# Patient Record
Sex: FEMALE | Race: WHITE | Employment: FULL TIME | URBAN - NONMETROPOLITAN AREA
[De-identification: names, ages, dates, MRNs, and addresses within clinical notes are randomized per-mention and may not be internally consistent; named-entity substitution may affect disease eponyms.]

---

## 2017-12-08 ENCOUNTER — APPOINTMENT (OUTPATIENT)
Dept: LAB | Age: 47
End: 2017-12-08
Attending: FAMILY MEDICINE
Payer: COMMERCIAL

## 2017-12-08 ENCOUNTER — OFFICE VISIT (OUTPATIENT)
Dept: FAMILY MEDICINE CLINIC | Facility: CLINIC | Age: 47
End: 2017-12-08

## 2017-12-08 VITALS
HEIGHT: 67.25 IN | OXYGEN SATURATION: 99 % | DIASTOLIC BLOOD PRESSURE: 70 MMHG | BODY MASS INDEX: 32.11 KG/M2 | TEMPERATURE: 98 F | HEART RATE: 70 BPM | SYSTOLIC BLOOD PRESSURE: 138 MMHG | WEIGHT: 207 LBS

## 2017-12-08 DIAGNOSIS — E55.9 VITAMIN D DEFICIENCY: ICD-10-CM

## 2017-12-08 DIAGNOSIS — Z80.3 FHX: BREAST CANCER: ICD-10-CM

## 2017-12-08 DIAGNOSIS — Z00.00 PREVENTATIVE HEALTH CARE: ICD-10-CM

## 2017-12-08 DIAGNOSIS — Z12.11 SCREEN FOR COLON CANCER: ICD-10-CM

## 2017-12-08 DIAGNOSIS — E78.00 HYPERCHOLESTEREMIA: ICD-10-CM

## 2017-12-08 DIAGNOSIS — E83.42 HYPOMAGNESEMIA: ICD-10-CM

## 2017-12-08 DIAGNOSIS — Z00.00 PREVENTATIVE HEALTH CARE: Primary | ICD-10-CM

## 2017-12-08 DIAGNOSIS — Z23 NEED FOR VACCINATION: ICD-10-CM

## 2017-12-08 DIAGNOSIS — E66.9 OBESITY (BMI 30.0-34.9): ICD-10-CM

## 2017-12-08 PROCEDURE — 90715 TDAP VACCINE 7 YRS/> IM: CPT | Performed by: FAMILY MEDICINE

## 2017-12-08 PROCEDURE — 99396 PREV VISIT EST AGE 40-64: CPT | Performed by: FAMILY MEDICINE

## 2017-12-08 PROCEDURE — 36415 COLL VENOUS BLD VENIPUNCTURE: CPT | Performed by: FAMILY MEDICINE

## 2017-12-08 PROCEDURE — 90472 IMMUNIZATION ADMIN EACH ADD: CPT | Performed by: FAMILY MEDICINE

## 2017-12-08 PROCEDURE — 90471 IMMUNIZATION ADMIN: CPT | Performed by: FAMILY MEDICINE

## 2017-12-08 PROCEDURE — 90686 IIV4 VACC NO PRSV 0.5 ML IM: CPT | Performed by: FAMILY MEDICINE

## 2017-12-08 PROCEDURE — 80061 LIPID PANEL: CPT | Performed by: FAMILY MEDICINE

## 2017-12-08 PROCEDURE — 82306 VITAMIN D 25 HYDROXY: CPT | Performed by: FAMILY MEDICINE

## 2017-12-08 PROCEDURE — 83735 ASSAY OF MAGNESIUM: CPT | Performed by: FAMILY MEDICINE

## 2017-12-08 PROCEDURE — 80053 COMPREHEN METABOLIC PANEL: CPT | Performed by: FAMILY MEDICINE

## 2017-12-08 NOTE — PATIENT INSTRUCTIONS
I reviewed age-appropriate preventive health screening exams. Discussed age-appropriate immunizations.   Would recommend Tdap and flu vaccine-given  We will check fasting lipids, CMP, vitamin D and magnesium level today  Discussed colon cancer screening op

## 2017-12-08 NOTE — H&P
HPI:   Clare Rojas is a 50year old female who presents for a complete physical exam.  Patient concerns:   none.      Wt Readings from Last 6 Encounters:  12/08/17 : 207 lb  04/07/16 : 207 lb 8 oz  02/24/15 : 205 lb  02/10/15 : 204 lb  10/14/14 : 205 lb Disorder Mother 54     CABG, AVR   • Cancer Mother 28     BREAST CANCER   • Obesity Mother    • Other Damon Herb Mother    • CABG [OTHER] Mother    • Asthma Sister      Pernicious anemia   • Other Damon Herb Sister    • CELIAC [OTHER] Sister    • Pulmonary Disea Wt 207 lb   SpO2 99%   BMI 32.18 kg/m²   Body mass index is 32.18 kg/m².    GENERAL: well developed, well nourished,in no apparent distress  SKIN: no rashes,no suspicious lesions, +tattoo right scapula, 4 cm lipoma right thoracic region  HEENT: Ears:  TMs file.  Patient Instructions   I reviewed age-appropriate preventive health screening exams. Discussed age-appropriate immunizations.   Would recommend Tdap and flu vaccine-given  We will check fasting lipids, CMP, vitamin D and magnesium level today  Discu

## 2017-12-09 DIAGNOSIS — E78.00 HYPERCHOLESTEREMIA: Primary | ICD-10-CM

## 2017-12-21 ENCOUNTER — TELEPHONE (OUTPATIENT)
Dept: FAMILY MEDICINE CLINIC | Facility: CLINIC | Age: 47
End: 2017-12-21

## 2018-08-27 ENCOUNTER — OFFICE VISIT (OUTPATIENT)
Dept: FAMILY MEDICINE CLINIC | Facility: CLINIC | Age: 48
End: 2018-08-27
Payer: COMMERCIAL

## 2018-08-27 ENCOUNTER — TELEPHONE (OUTPATIENT)
Dept: FAMILY MEDICINE CLINIC | Facility: CLINIC | Age: 48
End: 2018-08-27

## 2018-08-27 VITALS
SYSTOLIC BLOOD PRESSURE: 120 MMHG | DIASTOLIC BLOOD PRESSURE: 70 MMHG | OXYGEN SATURATION: 99 % | BODY MASS INDEX: 32 KG/M2 | HEART RATE: 74 BPM | WEIGHT: 204 LBS | TEMPERATURE: 98 F

## 2018-08-27 DIAGNOSIS — H18.9 CORNEAL ABNORMALITY: Primary | ICD-10-CM

## 2018-08-27 DIAGNOSIS — H11.002 PTERYGIUM EYE, LEFT: ICD-10-CM

## 2018-08-27 PROCEDURE — 99213 OFFICE O/P EST LOW 20 MIN: CPT | Performed by: FAMILY MEDICINE

## 2018-08-27 NOTE — TELEPHONE ENCOUNTER
Pt calls. States she woke up this morning with a blood in her iris. No known injury, didn't wear contacts yesterday. +mild irritation. No drng or swelling. Pt has not contacted here eye doctor yet.  Asks if she can be seen here first?

## 2018-08-27 NOTE — PATIENT INSTRUCTIONS
Appointment facilitated with patient's optometrist at 12:30 today  Avoid rubbing eye, wear sunglasses for comfort

## 2018-08-27 NOTE — TELEPHONE ENCOUNTER
Pt left a message on VM stating she woke up with blood in her eye and wants to know if she can be seen today.   Please call

## 2018-08-27 NOTE — PROGRESS NOTES
HPI:    Patient ID: April D Brent De is a 46year old female.   Patient presents with:  Eye Problem: LEFT EYE-- BLOOD, PAINFUL    HPI pt states she woke with blood overlying her left cornea today, no photophobia, visual disturbance, tearing, etc.  Pt has n No orders of the defined types were placed in this encounter.       Meds This Visit:  No prescriptions requested or ordered in this encounter    Imaging & Referrals:  None       #[id number]

## 2018-09-11 NOTE — PROGRESS NOTES
Date: 2018    To:  Mukund Bernard  : 1970    I hope this letter finds you doing well. I am writing to inform you of the following:        The biopsies obtained from your recent colonoscopy indicate that the polyps were adenomas which, althou

## 2019-01-25 ENCOUNTER — MED REC SCAN ONLY (OUTPATIENT)
Dept: FAMILY MEDICINE CLINIC | Facility: CLINIC | Age: 49
End: 2019-01-25

## 2019-01-25 ENCOUNTER — TELEPHONE (OUTPATIENT)
Dept: FAMILY MEDICINE CLINIC | Facility: CLINIC | Age: 49
End: 2019-01-25

## 2019-02-22 ENCOUNTER — LABORATORY ENCOUNTER (OUTPATIENT)
Dept: LAB | Age: 49
End: 2019-02-22
Attending: FAMILY MEDICINE
Payer: COMMERCIAL

## 2019-02-22 ENCOUNTER — OFFICE VISIT (OUTPATIENT)
Dept: FAMILY MEDICINE CLINIC | Facility: CLINIC | Age: 49
End: 2019-02-22
Payer: COMMERCIAL

## 2019-02-22 VITALS
WEIGHT: 207.13 LBS | DIASTOLIC BLOOD PRESSURE: 76 MMHG | SYSTOLIC BLOOD PRESSURE: 118 MMHG | HEIGHT: 67.5 IN | RESPIRATION RATE: 12 BRPM | HEART RATE: 88 BPM | BODY MASS INDEX: 32.13 KG/M2 | TEMPERATURE: 99 F

## 2019-02-22 DIAGNOSIS — Z13.0 SCREENING, ANEMIA, DEFICIENCY, IRON: ICD-10-CM

## 2019-02-22 DIAGNOSIS — D12.6 TUBULAR ADENOMA OF COLON: ICD-10-CM

## 2019-02-22 DIAGNOSIS — Z23 NEED FOR VACCINATION: ICD-10-CM

## 2019-02-22 DIAGNOSIS — Z00.00 LABORATORY EXAM ORDERED AS PART OF ROUTINE GENERAL MEDICAL EXAMINATION: ICD-10-CM

## 2019-02-22 DIAGNOSIS — E55.9 VITAMIN D DEFICIENCY: ICD-10-CM

## 2019-02-22 DIAGNOSIS — Z12.4 SCREENING FOR CERVICAL CANCER: ICD-10-CM

## 2019-02-22 DIAGNOSIS — E78.00 HYPERCHOLESTEREMIA: ICD-10-CM

## 2019-02-22 DIAGNOSIS — E66.9 OBESITY (BMI 30.0-34.9): ICD-10-CM

## 2019-02-22 DIAGNOSIS — J30.89 SEASONAL ALLERGIC RHINITIS DUE TO OTHER ALLERGIC TRIGGER: ICD-10-CM

## 2019-02-22 DIAGNOSIS — H11.002 PTERYGIUM EYE, LEFT: ICD-10-CM

## 2019-02-22 DIAGNOSIS — H35.30 MACULAR DEGENERATION OF BOTH EYES, UNSPECIFIED TYPE: ICD-10-CM

## 2019-02-22 DIAGNOSIS — Z80.3 FHX: BREAST CANCER: ICD-10-CM

## 2019-02-22 DIAGNOSIS — E83.42 HYPOMAGNESEMIA: ICD-10-CM

## 2019-02-22 DIAGNOSIS — Z00.00 PREVENTATIVE HEALTH CARE: Primary | ICD-10-CM

## 2019-02-22 PROBLEM — H18.452 SALZMANN'S NODULAR DYSTROPHY OF LEFT EYE: Status: ACTIVE | Noted: 2018-02-21

## 2019-02-22 LAB
ALBUMIN SERPL-MCNC: 4.1 G/DL (ref 3.4–5)
ALBUMIN/GLOB SERPL: 1.1 {RATIO} (ref 1–2)
ALP LIVER SERPL-CCNC: 68 U/L (ref 41–108)
ALT SERPL-CCNC: 47 U/L (ref 13–56)
ANION GAP SERPL CALC-SCNC: 10 MMOL/L (ref 0–18)
AST SERPL-CCNC: 38 U/L (ref 15–37)
BASOPHILS # BLD AUTO: 0.01 X10(3) UL (ref 0–0.2)
BASOPHILS NFR BLD AUTO: 0.2 %
BILIRUB SERPL-MCNC: 0.4 MG/DL (ref 0.1–2)
BUN BLD-MCNC: 11 MG/DL (ref 7–18)
BUN/CREAT SERPL: 10.6 (ref 10–20)
CALCIUM BLD-MCNC: 9.5 MG/DL (ref 8.5–10.1)
CHLORIDE SERPL-SCNC: 101 MMOL/L (ref 98–107)
CHOLEST SMN-MCNC: 241 MG/DL (ref ?–200)
CO2 SERPL-SCNC: 27 MMOL/L (ref 21–32)
CREAT BLD-MCNC: 1.04 MG/DL (ref 0.55–1.02)
DEPRECATED RDW RBC AUTO: 41.2 FL (ref 35.1–46.3)
EOSINOPHIL # BLD AUTO: 0.02 X10(3) UL (ref 0–0.7)
EOSINOPHIL NFR BLD AUTO: 0.4 %
ERYTHROCYTE [DISTWIDTH] IN BLOOD BY AUTOMATED COUNT: 12.5 % (ref 11–15)
GLOBULIN PLAS-MCNC: 3.8 G/DL (ref 2.8–4.4)
GLUCOSE BLD-MCNC: 82 MG/DL (ref 70–99)
HAV IGM SER QL: 2.1 MG/DL (ref 1.6–2.6)
HCT VFR BLD AUTO: 41.6 % (ref 35–48)
HDLC SERPL-MCNC: 73 MG/DL (ref 40–59)
HGB BLD-MCNC: 13.8 G/DL (ref 12–16)
IMM GRANULOCYTES # BLD AUTO: 0.01 X10(3) UL (ref 0–1)
IMM GRANULOCYTES NFR BLD: 0.2 %
LDLC SERPL CALC-MCNC: 152 MG/DL (ref ?–100)
LYMPHOCYTES # BLD AUTO: 1.48 X10(3) UL (ref 1–4)
LYMPHOCYTES NFR BLD AUTO: 31.4 %
M PROTEIN MFR SERPL ELPH: 7.9 G/DL (ref 6.4–8.2)
MCH RBC QN AUTO: 30.1 PG (ref 26–34)
MCHC RBC AUTO-ENTMCNC: 33.2 G/DL (ref 31–37)
MCV RBC AUTO: 90.6 FL (ref 80–100)
MONOCYTES # BLD AUTO: 0.55 X10(3) UL (ref 0.1–1)
MONOCYTES NFR BLD AUTO: 11.7 %
NEUTROPHILS # BLD AUTO: 2.64 X10 (3) UL (ref 1.5–7.7)
NEUTROPHILS # BLD AUTO: 2.64 X10(3) UL (ref 1.5–7.7)
NEUTROPHILS NFR BLD AUTO: 56.1 %
NONHDLC SERPL-MCNC: 168 MG/DL (ref ?–130)
OSMOLALITY SERPL CALC.SUM OF ELEC: 284 MOSM/KG (ref 275–295)
PLATELET # BLD AUTO: 218 10(3)UL (ref 150–450)
POTASSIUM SERPL-SCNC: 4.2 MMOL/L (ref 3.5–5.1)
RBC # BLD AUTO: 4.59 X10(6)UL (ref 3.8–5.3)
SODIUM SERPL-SCNC: 138 MMOL/L (ref 136–145)
TRIGL SERPL-MCNC: 82 MG/DL (ref 30–149)
VIT D+METAB SERPL-MCNC: 66.5 NG/ML (ref 30–100)
VLDLC SERPL CALC-MCNC: 16 MG/DL (ref 0–30)
WBC # BLD AUTO: 4.7 X10(3) UL (ref 4–11)

## 2019-02-22 PROCEDURE — 85025 COMPLETE CBC W/AUTO DIFF WBC: CPT | Performed by: FAMILY MEDICINE

## 2019-02-22 PROCEDURE — 87624 HPV HI-RISK TYP POOLED RSLT: CPT | Performed by: FAMILY MEDICINE

## 2019-02-22 PROCEDURE — 36415 COLL VENOUS BLD VENIPUNCTURE: CPT | Performed by: FAMILY MEDICINE

## 2019-02-22 PROCEDURE — 90471 IMMUNIZATION ADMIN: CPT | Performed by: FAMILY MEDICINE

## 2019-02-22 PROCEDURE — 83735 ASSAY OF MAGNESIUM: CPT | Performed by: FAMILY MEDICINE

## 2019-02-22 PROCEDURE — 82306 VITAMIN D 25 HYDROXY: CPT | Performed by: FAMILY MEDICINE

## 2019-02-22 PROCEDURE — 99396 PREV VISIT EST AGE 40-64: CPT | Performed by: FAMILY MEDICINE

## 2019-02-22 PROCEDURE — 90686 IIV4 VACC NO PRSV 0.5 ML IM: CPT | Performed by: FAMILY MEDICINE

## 2019-02-22 PROCEDURE — 80053 COMPREHEN METABOLIC PANEL: CPT | Performed by: FAMILY MEDICINE

## 2019-02-22 PROCEDURE — 80061 LIPID PANEL: CPT | Performed by: FAMILY MEDICINE

## 2019-02-22 RX ORDER — AMINO ACIDS/MV,IRON,MIN
TABLET ORAL
COMMUNITY

## 2019-02-22 RX ORDER — CETIRIZINE HYDROCHLORIDE 10 MG/1
10 TABLET ORAL DAILY
COMMUNITY

## 2019-02-22 NOTE — PATIENT INSTRUCTIONS
I reviewed age-appropriate preventive health screening exams as well as immunizations.   Flu vaccine provided  ThinPrep Pap smear with HPV reflex was obtained  Reviewed importance of monthly self breast exams and annual mammograms  Discussed importance of l

## 2019-02-22 NOTE — H&P
HPI:   April D Flavia Fu is a 44year old female who presents for a complete physical exam.  Patient concerns:   none.      Wt Readings from Last 6 Encounters:  02/22/19 : 207 lb 2 oz  08/27/18 : 204 lb  08/22/18 : 200 lb  02/21/18 : 200 lb  12/08/17 : 207 BUTTOCK   • SUPERFICIAL KERATECTOMY Left 2/21/2018    Performed by Belen Kirk MD at [facility]      Family History   Problem Relation Age of Onset   • Heart Attack Father 36        MI   • Hypertension Father    • Cancer Father         NM discharge or itching,no uterine bleeding x several yrs, denies incontinence    MUSCULOSKELETAL: denies back or joint pain  NEURO: denies headaches, tremors, dizziness, numbness, tingling, muscular weakness  PSYCHE: denies depression or anxiety, denies any Affect:bright  ASSESSMENT AND PLAN:   April D Tiana Nair is a 44year old female   Preventative health care  (primary encounter diagnosis)  Hypercholesteremia  Fhx: breast cancer  Tubular adenoma of colon  Vitamin d deficiency  Macular degeneration of both

## 2019-02-23 DIAGNOSIS — E78.00 HYPERCHOLESTEREMIA: Primary | ICD-10-CM

## 2019-02-23 DIAGNOSIS — E55.9 VITAMIN D DEFICIENCY: ICD-10-CM

## 2019-02-26 LAB — HPV I/H RISK 1 DNA SPEC QL NAA+PROBE: NEGATIVE

## 2020-03-16 ENCOUNTER — TELEPHONE (OUTPATIENT)
Dept: FAMILY MEDICINE CLINIC | Facility: CLINIC | Age: 50
End: 2020-03-16

## 2020-03-19 ENCOUNTER — MED REC SCAN ONLY (OUTPATIENT)
Dept: FAMILY MEDICINE CLINIC | Facility: CLINIC | Age: 50
End: 2020-03-19

## 2020-03-21 ENCOUNTER — TELEPHONE (OUTPATIENT)
Dept: FAMILY MEDICINE CLINIC | Facility: CLINIC | Age: 50
End: 2020-03-21

## 2020-03-21 NOTE — TELEPHONE ENCOUNTER
Please advise patient the additional views and ultrasound of the right breast demonstrated a probably benign cyst.  A follow-up right breast ultrasound and mammogram are recommended in 6 months

## 2020-07-06 ENCOUNTER — TELEPHONE (OUTPATIENT)
Dept: FAMILY MEDICINE CLINIC | Facility: CLINIC | Age: 50
End: 2020-07-06

## 2020-07-06 NOTE — TELEPHONE ENCOUNTER
[place], PRETTY SURE SHE HAS AN EYE INFECTION, WANTS TO KNOW IF MEDS CAN BE CALLED IN TO A PHARMACY IN THAT AREA OR IF SHE NEEDS TO FIND A URGENT CARE OR SOMETHING TO BE SEEN AT?

## 2020-10-14 ENCOUNTER — TELEPHONE (OUTPATIENT)
Dept: FAMILY MEDICINE CLINIC | Facility: CLINIC | Age: 50
End: 2020-10-14

## 2020-10-14 NOTE — TELEPHONE ENCOUNTER
Please advise patient that is been over a year and a half since she has been seen.   I received her mammogram report and would like to have her follow-up in the office to discuss the mammogram result as well as undergo a breast exam.  Please schedule      D

## 2020-10-15 ENCOUNTER — OFFICE VISIT (OUTPATIENT)
Dept: FAMILY MEDICINE CLINIC | Facility: CLINIC | Age: 50
End: 2020-10-15
Payer: COMMERCIAL

## 2020-10-15 VITALS
BODY MASS INDEX: 32.44 KG/M2 | HEART RATE: 86 BPM | WEIGHT: 204.25 LBS | HEIGHT: 66.5 IN | SYSTOLIC BLOOD PRESSURE: 112 MMHG | DIASTOLIC BLOOD PRESSURE: 80 MMHG | TEMPERATURE: 97 F | OXYGEN SATURATION: 97 % | RESPIRATION RATE: 12 BRPM

## 2020-10-15 DIAGNOSIS — R92.8 ABNORMAL MAMMOGRAM OF RIGHT BREAST: Primary | ICD-10-CM

## 2020-10-15 DIAGNOSIS — N63.0 BREAST MASS: ICD-10-CM

## 2020-10-15 DIAGNOSIS — Z23 NEED FOR VACCINATION: ICD-10-CM

## 2020-10-15 DIAGNOSIS — Z80.3 FHX: BREAST CANCER: ICD-10-CM

## 2020-10-15 PROCEDURE — 90686 IIV4 VACC NO PRSV 0.5 ML IM: CPT | Performed by: FAMILY MEDICINE

## 2020-10-15 PROCEDURE — 3079F DIAST BP 80-89 MM HG: CPT | Performed by: FAMILY MEDICINE

## 2020-10-15 PROCEDURE — 90471 IMMUNIZATION ADMIN: CPT | Performed by: FAMILY MEDICINE

## 2020-10-15 PROCEDURE — 3074F SYST BP LT 130 MM HG: CPT | Performed by: FAMILY MEDICINE

## 2020-10-15 PROCEDURE — 3008F BODY MASS INDEX DOCD: CPT | Performed by: FAMILY MEDICINE

## 2020-10-15 PROCEDURE — 99213 OFFICE O/P EST LOW 20 MIN: CPT | Performed by: FAMILY MEDICINE

## 2020-10-15 NOTE — PATIENT INSTRUCTIONS
I reviewed mammogram reports and breast exam with patient. I agree with ultrasound localized needle biopsy. Patient will schedule through [facility] at her request to keep all of her care within the same hospital system.   Flu vaccine

## 2020-10-15 NOTE — PROGRESS NOTES
HPI:    Patient ID: April D Óscar Og is a 44year old female.     Patient presents with:  Abnormal Mammogram    Patient had a mammogram on 10/13/2020  IMPRESSION: SUSPICIOUS OF MALIGNANCY, TARGETED ULTRASOUND SUSPICIOUS OF MALIGNANCY   The 1 cm x 0.6 cm x • Multiple Vitamins-Minerals (OCUVITE EXTRA) Oral Tab Take by mouth. • cetirizine 10 MG Oral Tab Take 10 mg by mouth daily. • Levonorgestrel 13.5 MG Intrauterine IUD by Intrauterine route.      • Magnesium Oxide 400 (241.3 MG) MG Oral Tab Take 800 requested or ordered in this encounter       Imaging & Referrals:  FLULAVAL INFLUENZA VACCINE QUAD PRESERVATIVE FREE 0.5 ML  ELISE BIOPSY STEREO NODULE 1 SITE RIGHT (CPT=19081)       ML#[id number]

## 2020-11-06 ENCOUNTER — MED REC SCAN ONLY (OUTPATIENT)
Dept: FAMILY MEDICINE CLINIC | Facility: CLINIC | Age: 50
End: 2020-11-06

## 2021-04-13 ENCOUNTER — PATIENT MESSAGE (OUTPATIENT)
Dept: FAMILY MEDICINE CLINIC | Facility: CLINIC | Age: 51
End: 2021-04-13

## 2021-04-13 NOTE — TELEPHONE ENCOUNTER
From: Clare Ahumada  To: Matthieu Raymond DO  Sent: 4/13/2021 10:05 AM CDT  Subject: Non-Urgent Medical Question    I recently had my first vaccine with the [facility] and will have the second one May 6th.  I am also due for a follow up Mammogram on my

## 2021-05-07 ENCOUNTER — PATIENT MESSAGE (OUTPATIENT)
Dept: FAMILY MEDICINE CLINIC | Facility: CLINIC | Age: 51
End: 2021-05-07

## 2021-05-07 NOTE — TELEPHONE ENCOUNTER
From: Clare Ahumada  To: Jordan Doe DO  Sent: 5/7/2021 7:26 AM CDT  Subject: Other    Just an update so that you can update my records that i received my second Covid shot yesterday Coalfred Vuong lot# [id number].    Lali Shepherd has had both of his too but he

## 2021-08-25 ENCOUNTER — OFFICE VISIT (OUTPATIENT)
Dept: FAMILY MEDICINE CLINIC | Facility: CLINIC | Age: 51
End: 2021-08-25
Payer: COMMERCIAL

## 2021-08-25 ENCOUNTER — LAB ENCOUNTER (OUTPATIENT)
Dept: LAB | Age: 51
End: 2021-08-25
Attending: FAMILY MEDICINE
Payer: COMMERCIAL

## 2021-08-25 ENCOUNTER — TELEPHONE (OUTPATIENT)
Dept: FAMILY MEDICINE CLINIC | Facility: CLINIC | Age: 51
End: 2021-08-25

## 2021-08-25 VITALS
DIASTOLIC BLOOD PRESSURE: 80 MMHG | HEIGHT: 66.5 IN | WEIGHT: 207 LBS | BODY MASS INDEX: 32.87 KG/M2 | RESPIRATION RATE: 16 BRPM | TEMPERATURE: 98 F | SYSTOLIC BLOOD PRESSURE: 124 MMHG | OXYGEN SATURATION: 98 % | HEART RATE: 80 BPM

## 2021-08-25 DIAGNOSIS — Z00.00 LABORATORY EXAM ORDERED AS PART OF ROUTINE GENERAL MEDICAL EXAMINATION: ICD-10-CM

## 2021-08-25 DIAGNOSIS — E78.00 HYPERCHOLESTEREMIA: ICD-10-CM

## 2021-08-25 DIAGNOSIS — Z12.31 ENCOUNTER FOR SCREENING MAMMOGRAM FOR HIGH-RISK PATIENT: ICD-10-CM

## 2021-08-25 DIAGNOSIS — E55.9 VITAMIN D DEFICIENCY: ICD-10-CM

## 2021-08-25 DIAGNOSIS — R00.2 PALPITATIONS: ICD-10-CM

## 2021-08-25 DIAGNOSIS — D12.6 TUBULAR ADENOMA OF COLON: ICD-10-CM

## 2021-08-25 DIAGNOSIS — Z00.00 PREVENTATIVE HEALTH CARE: Primary | ICD-10-CM

## 2021-08-25 DIAGNOSIS — Z23 NEED FOR SHINGLES VACCINE: Primary | ICD-10-CM

## 2021-08-25 DIAGNOSIS — H35.30 MACULAR DEGENERATION OF BOTH EYES, UNSPECIFIED TYPE: ICD-10-CM

## 2021-08-25 DIAGNOSIS — Z80.3 FHX: BREAST CANCER: ICD-10-CM

## 2021-08-25 DIAGNOSIS — Z23 NEED FOR VACCINATION: ICD-10-CM

## 2021-08-25 DIAGNOSIS — R13.10 SWALLOWING DYSFUNCTION: ICD-10-CM

## 2021-08-25 DIAGNOSIS — Z13.29 SCREENING FOR THYROID DISORDER: ICD-10-CM

## 2021-08-25 DIAGNOSIS — F51.04 PSYCHOPHYSIOLOGICAL INSOMNIA: ICD-10-CM

## 2021-08-25 LAB
ALBUMIN SERPL-MCNC: 4.1 G/DL (ref 3.4–5)
ALBUMIN/GLOB SERPL: 1.1 {RATIO} (ref 1–2)
ALP LIVER SERPL-CCNC: 68 U/L
ALT SERPL-CCNC: 22 U/L
ANION GAP SERPL CALC-SCNC: 7 MMOL/L (ref 0–18)
AST SERPL-CCNC: 17 U/L (ref 15–37)
BILIRUB SERPL-MCNC: 0.6 MG/DL (ref 0.1–2)
BUN BLD-MCNC: 17 MG/DL (ref 7–18)
CALCIUM BLD-MCNC: 9.4 MG/DL (ref 8.5–10.1)
CHLORIDE SERPL-SCNC: 104 MMOL/L (ref 98–112)
CHOLEST SMN-MCNC: 291 MG/DL (ref ?–200)
CO2 SERPL-SCNC: 27 MMOL/L (ref 21–32)
CREAT BLD-MCNC: 1.09 MG/DL
GLOBULIN PLAS-MCNC: 3.8 G/DL (ref 2.8–4.4)
GLUCOSE BLD-MCNC: 83 MG/DL (ref 70–99)
HDLC SERPL-MCNC: 80 MG/DL (ref 40–59)
LDLC SERPL CALC-MCNC: 192 MG/DL (ref ?–100)
M PROTEIN MFR SERPL ELPH: 7.9 G/DL (ref 6.4–8.2)
NONHDLC SERPL-MCNC: 211 MG/DL (ref ?–130)
OSMOLALITY SERPL CALC.SUM OF ELEC: 287 MOSM/KG (ref 275–295)
POTASSIUM SERPL-SCNC: 4 MMOL/L (ref 3.5–5.1)
SODIUM SERPL-SCNC: 138 MMOL/L (ref 136–145)
TRIGL SERPL-MCNC: 110 MG/DL (ref 30–149)
TSI SER-ACNC: 2.22 MIU/ML (ref 0.36–3.74)
VIT D+METAB SERPL-MCNC: 87.3 NG/ML (ref 30–100)
VLDLC SERPL CALC-MCNC: 23 MG/DL (ref 0–30)

## 2021-08-25 PROCEDURE — 90471 IMMUNIZATION ADMIN: CPT | Performed by: FAMILY MEDICINE

## 2021-08-25 PROCEDURE — 3074F SYST BP LT 130 MM HG: CPT | Performed by: FAMILY MEDICINE

## 2021-08-25 PROCEDURE — 3079F DIAST BP 80-89 MM HG: CPT | Performed by: FAMILY MEDICINE

## 2021-08-25 PROCEDURE — 90750 HZV VACC RECOMBINANT IM: CPT | Performed by: FAMILY MEDICINE

## 2021-08-25 PROCEDURE — 99396 PREV VISIT EST AGE 40-64: CPT | Performed by: FAMILY MEDICINE

## 2021-08-25 PROCEDURE — 80053 COMPREHEN METABOLIC PANEL: CPT | Performed by: FAMILY MEDICINE

## 2021-08-25 PROCEDURE — 82306 VITAMIN D 25 HYDROXY: CPT | Performed by: FAMILY MEDICINE

## 2021-08-25 PROCEDURE — 84443 ASSAY THYROID STIM HORMONE: CPT | Performed by: FAMILY MEDICINE

## 2021-08-25 PROCEDURE — 3008F BODY MASS INDEX DOCD: CPT | Performed by: FAMILY MEDICINE

## 2021-08-25 PROCEDURE — 80061 LIPID PANEL: CPT | Performed by: FAMILY MEDICINE

## 2021-08-25 NOTE — PATIENT INSTRUCTIONS
1. Preventative health care  I reviewed age-appropriate preventative health screening exams. Repeat Pap 2024    2. FHx: breast cancer  Discussed importance of continued annual surveillance    3.  Encounter for screening mammogram for high-risk patient  Con

## 2021-08-25 NOTE — H&P
HPI:   Clare ESPINOSA Edgar Rosario is a 46year old female who presents for a complete physical exam.  Patient concerns:   See ROS.      Wt Readings from Last 6 Encounters:  08/25/21 : 207 lb (93.9 kg)  10/15/20 : 204 lb 4 oz (92.6 kg)  02/22/19 : 207 lb 2 oz (94 kg) HISTORY      LEFT SALPINGOOPHERECTOMY DUE TO ECTOPIC PREG   • OTHER SURGICAL HISTORY Left     superficial keratectomy   • SKIN SURGERY      EXCISION OF HISTIOCYTOMA RIGHT BUTTOCK   • STEREOTACTIC BREAST BIOPSY  10/13/2020    right breast mass, pathology: f problems swallowing, feels like her throat spasms,waits and then food goes down, no pain, no choking, occurred last night w/ rice, several times per week  LUNGS: denies shortness of breath with exertion, no coughing or wheezing  CARDIOVASCULAR: denies ches cervix without lesions, anteverted/anteflexed uterus palpable without masses, no adnexal masses or tenderness noted  MUSCULOSKELETAL: back is non-tender,FROM of the back  EXTREMITIES: FROM of all joints tested,  no cyanosis, clubbing or edema, no varicosit adenoma of colon-9/5/18  Repeat colonoscopy 2023    5. Macular degeneration of both eyes, unspecified type  Continue ophthalmology follow-up    6. Hypercholesteremia- high hdl  Reviewed goals for lipids  - VENIPUNCTURE  - LIPID PANEL; Future    7.  Psychoph

## 2021-10-07 ENCOUNTER — HOSPITAL ENCOUNTER (OUTPATIENT)
Dept: MAMMOGRAPHY | Age: 51
Discharge: HOME | End: 2021-10-07
Attending: FAMILY MEDICINE
Payer: COMMERCIAL

## 2021-10-07 DIAGNOSIS — Z12.31 ENCOUNTER FOR SCREENING MAMMOGRAM FOR HIGH-RISK PATIENT: ICD-10-CM

## 2021-10-07 PROCEDURE — 77067 SCR MAMMO BI INCL CAD: CPT | Performed by: FAMILY MEDICINE

## 2021-10-07 PROCEDURE — 77063 BREAST TOMOSYNTHESIS BI: CPT | Performed by: FAMILY MEDICINE

## 2021-10-15 ENCOUNTER — TELEPHONE (OUTPATIENT)
Dept: MAMMOGRAPHY | Facility: HOSPITAL | Age: 51
End: 2021-10-15

## 2021-10-15 ENCOUNTER — HOSPITAL ENCOUNTER (OUTPATIENT)
Dept: MAMMOGRAPHY | Facility: HOSPITAL | Age: 51
Discharge: HOME | End: 2021-10-15
Attending: FAMILY MEDICINE
Payer: COMMERCIAL

## 2021-10-15 DIAGNOSIS — R92.2 INCONCLUSIVE MAMMOGRAM: ICD-10-CM

## 2021-10-15 PROCEDURE — 77061 BREAST TOMOSYNTHESIS UNI: CPT | Performed by: FAMILY MEDICINE

## 2021-10-15 PROCEDURE — 76642 ULTRASOUND BREAST LIMITED: CPT | Performed by: FAMILY MEDICINE

## 2021-10-15 PROCEDURE — 77065 DX MAMMO INCL CAD UNI: CPT | Performed by: FAMILY MEDICINE

## 2021-10-15 NOTE — TELEPHONE ENCOUNTER
Attempted to call patient re: new breast imaging recommendation. Message left for patient to call back.

## 2021-10-18 NOTE — IMAGING NOTE
8064: Roel Cage is recommended for an ultrasound guided biopsy of the right breast by . Spoke with Clare Ahumada at this time. Introduced myself as breast care coordinator.      History   Inconclusive mammogram     FINDINGS:     Ce Iniguez from the nipple) demonstrates benign breast tissue with fibrosis. The pathology results do not mention   calcifications. Pathology is considered discordant and a repeat ultrasound-guided core needle biopsy is recommended.      BI-RADS CATEGORY:    DIAGNOS

## 2021-10-19 ENCOUNTER — OFFICE VISIT (OUTPATIENT)
Dept: SURGERY | Facility: CLINIC | Age: 51
End: 2021-10-19
Payer: COMMERCIAL

## 2021-10-19 VITALS — WEIGHT: 207 LBS | HEIGHT: 66.5 IN | BODY MASS INDEX: 32.87 KG/M2

## 2021-10-19 DIAGNOSIS — N63.10 BREAST MASS, RIGHT: ICD-10-CM

## 2021-10-19 DIAGNOSIS — Z91.89 AT HIGH RISK FOR BREAST CANCER: Primary | ICD-10-CM

## 2021-10-19 PROCEDURE — 99244 OFF/OP CNSLTJ NEW/EST MOD 40: CPT | Performed by: SURGERY

## 2021-10-19 PROCEDURE — 3008F BODY MASS INDEX DOCD: CPT | Performed by: SURGERY

## 2021-10-19 NOTE — H&P
April D Donald Sanchez is a 46year old female  Patient presents with:  Breast Problem: new patient referred by Dr. Catherine Levy for breast consult.  mammogram and US breast done at [facility]. pt had right breast bx last year, radiology is recommending another bx right arjun HISTIOCYTOMA RIGHT BUTTOCK   • STEREOTACTIC BREAST BIOPSY  10/13/2020    right breast mass, pathology: fibrosis     Multiple Vitamins-Minerals (OCUVITE EXTRA) Oral Tab, Take by mouth., Disp: , Rfl:   cetirizine 10 MG Oral Tab, Take 10 mg by mouth daily. , D diarrhea; no rectal bleeding; no heartburn  GENITAL/: no dysuria, urgency or frequency, no tea colored urine  MUSCULOSKELETAL: no joint complaints upper or lower extremities  HEMATOLOGY: denies hx anemia; denies bruising or excessive bleeding  Endocrine: 68  41 - 108 U/L Final   • Bilirubin, Total 08/25/2021 0.6  0.1 - 2.0 mg/dL Final   • Total Protein 08/25/2021 7.9  6.4 - 8.2 g/dL Final   • Albumin 08/25/2021 4.1  3.4 - 5.0 g/dL Final   • Globulin  08/25/2021 3.8  2.8 - 4.4 g/dL Final   • A/G Ratio 08/25 high >=190 mg/dL       • FASTING 08/25/2021 Patient not present   Final   • Vitamin D, 25OH, Total 08/25/2021 87.3  30.0 - 100.0 ng/mL Final    Literature Recommendations for 25(OH)D levels are:  Range           Vitamin D Status   <20    ng/mL      Deficie

## 2021-10-21 ENCOUNTER — TELEPHONE (OUTPATIENT)
Dept: SURGERY | Facility: CLINIC | Age: 51
End: 2021-10-21

## 2021-10-21 NOTE — TELEPHONE ENCOUNTER
MRI BREAST [facility] [address] [facility] # [id number]  VALID 10/21/21 - 11/19/21  PT NOTIFIED VIA VM.

## 2021-10-22 ENCOUNTER — TELEPHONE (OUTPATIENT)
Dept: SURGERY | Facility: CLINIC | Age: 51
End: 2021-10-22

## 2021-11-04 ENCOUNTER — TELEPHONE (OUTPATIENT)
Dept: SURGERY | Facility: CLINIC | Age: 51
End: 2021-11-04

## 2021-11-04 NOTE — TELEPHONE ENCOUNTER
Pt. Contacted office stating her authorization for her MRI is only good from 10/21-11/19 and she isn't able to get in to get it until 11/22. Pt. Is requesting we extend the authorization. I v/u.  I checked with the nurses to see if they would be able to do

## 2021-11-08 ENCOUNTER — HOSPITAL ENCOUNTER (OUTPATIENT)
Dept: MAMMOGRAPHY | Facility: HOSPITAL | Age: 51
Discharge: HOME | End: 2021-11-08
Attending: SURGERY
Payer: COMMERCIAL

## 2021-11-08 DIAGNOSIS — N63.10 BREAST MASS, RIGHT: ICD-10-CM

## 2021-11-08 PROCEDURE — 88305 TISSUE EXAM BY PATHOLOGIST: CPT | Performed by: SURGERY

## 2021-11-08 PROCEDURE — 19083 BX BREAST 1ST LESION US IMAG: CPT | Performed by: SURGERY

## 2021-11-08 PROCEDURE — 77065 DX MAMMO INCL CAD UNI: CPT | Performed by: SURGERY

## 2022-01-19 ENCOUNTER — TELEPHONE (OUTPATIENT)
Dept: FAMILY MEDICINE CLINIC | Facility: CLINIC | Age: 52
End: 2022-01-19

## 2022-01-19 DIAGNOSIS — Z23 NEED FOR SHINGLES VACCINE: Primary | ICD-10-CM

## 2022-01-19 NOTE — TELEPHONE ENCOUNTER
Pt is scheduled for 2nd Shingrex on 1/21/22  (first one given on 8/25/21)    Order placed---please sign

## 2022-01-21 ENCOUNTER — NURSE ONLY (OUTPATIENT)
Dept: FAMILY MEDICINE CLINIC | Facility: CLINIC | Age: 52
End: 2022-01-21
Payer: COMMERCIAL

## 2022-01-21 PROCEDURE — 90750 HZV VACC RECOMBINANT IM: CPT | Performed by: FAMILY MEDICINE

## 2022-01-21 PROCEDURE — 90471 IMMUNIZATION ADMIN: CPT | Performed by: FAMILY MEDICINE

## 2022-04-27 ENCOUNTER — PATIENT MESSAGE (OUTPATIENT)
Dept: FAMILY MEDICINE CLINIC | Facility: CLINIC | Age: 52
End: 2022-04-27

## 2022-04-27 NOTE — TELEPHONE ENCOUNTER
See below. I don't see in her path results or Dr. Astrid Adams note to repeat mammogram in 6 months. Okay to place order?   Or do you want Dr. Tiki Cline to?

## 2022-04-27 NOTE — TELEPHONE ENCOUNTER
From: Clare Ahumada  To: Jinny Hernandez DO  Sent: 4/27/2022 6:24 AM CDT  Subject: mamm followup     It has been 6 months since my biopsy and they are recommending a follow up mammogram. Could you please submit an order for that for me? Or do i have to ask Elissa Rausch for it? I will go to [facility] in [place] for that since that is where all my records are now. if you have questions my number is [phone].  thanks- April

## 2022-05-17 ENCOUNTER — TELEPHONE (OUTPATIENT)
Dept: FAMILY MEDICINE CLINIC | Facility: CLINIC | Age: 52
End: 2022-05-17

## 2022-05-17 ENCOUNTER — HOSPITAL ENCOUNTER (OUTPATIENT)
Dept: MAMMOGRAPHY | Facility: HOSPITAL | Age: 52
Discharge: HOME | End: 2022-05-17
Attending: FAMILY MEDICINE
Payer: COMMERCIAL

## 2022-05-17 DIAGNOSIS — R92.8 FOLLOW-UP EXAMINATION OF ABNORMAL MAMMOGRAM: ICD-10-CM

## 2022-05-17 DIAGNOSIS — R92.8 FOLLOW-UP EXAMINATION OF ABNORMAL MAMMOGRAM: Primary | ICD-10-CM

## 2022-05-17 PROCEDURE — 77065 DX MAMMO INCL CAD UNI: CPT | Performed by: FAMILY MEDICINE

## 2022-05-17 PROCEDURE — 77061 BREAST TOMOSYNTHESIS UNI: CPT | Performed by: FAMILY MEDICINE

## 2022-05-17 PROCEDURE — 76642 ULTRASOUND BREAST LIMITED: CPT | Performed by: FAMILY MEDICINE

## 2022-05-17 NOTE — TELEPHONE ENCOUNTER
Pt's R breat f/u mammogram was ordered as routine----needs to be changed to diagnostic. New order placed.

## 2022-05-18 DIAGNOSIS — Z12.31 ENCOUNTER FOR SCREENING MAMMOGRAM FOR MALIGNANT NEOPLASM OF BREAST: Primary | ICD-10-CM

## 2022-08-30 ENCOUNTER — OFFICE VISIT (OUTPATIENT)
Dept: FAMILY MEDICINE CLINIC | Facility: CLINIC | Age: 52
End: 2022-08-30
Payer: COMMERCIAL

## 2022-08-30 VITALS
TEMPERATURE: 98 F | WEIGHT: 205 LBS | BODY MASS INDEX: 32.56 KG/M2 | HEART RATE: 77 BPM | HEIGHT: 66.5 IN | SYSTOLIC BLOOD PRESSURE: 110 MMHG | OXYGEN SATURATION: 98 % | RESPIRATION RATE: 18 BRPM | DIASTOLIC BLOOD PRESSURE: 70 MMHG

## 2022-08-30 DIAGNOSIS — L98.9 SKIN LESION OF CHEEK: Primary | ICD-10-CM

## 2022-08-30 PROCEDURE — 99213 OFFICE O/P EST LOW 20 MIN: CPT | Performed by: FAMILY MEDICINE

## 2022-08-30 PROCEDURE — 3008F BODY MASS INDEX DOCD: CPT | Performed by: FAMILY MEDICINE

## 2022-08-30 PROCEDURE — 3078F DIAST BP <80 MM HG: CPT | Performed by: FAMILY MEDICINE

## 2022-08-30 PROCEDURE — 3074F SYST BP LT 130 MM HG: CPT | Performed by: FAMILY MEDICINE

## 2022-08-30 NOTE — PATIENT INSTRUCTIONS
I reviewed signs and symptoms of skin cancer as well as importance of frequent application of sunscreen. Would recommend topical cryotherapy with liquid nitrogen. Lesion treated with freeze thaw freeze cycle, patient tolerated well, have asked patient to either send a picture or report response to treatment and 2 to 3 weeks.

## 2022-12-15 ENCOUNTER — HOSPITAL ENCOUNTER (OUTPATIENT)
Dept: MAMMOGRAPHY | Facility: HOSPITAL | Age: 52
Discharge: HOME | End: 2022-12-15
Attending: FAMILY MEDICINE
Payer: COMMERCIAL

## 2022-12-15 DIAGNOSIS — Z12.31 ENCOUNTER FOR SCREENING MAMMOGRAM FOR MALIGNANT NEOPLASM OF BREAST: ICD-10-CM

## 2022-12-15 PROCEDURE — 77063 BREAST TOMOSYNTHESIS BI: CPT | Performed by: FAMILY MEDICINE

## 2022-12-15 PROCEDURE — 77067 SCR MAMMO BI INCL CAD: CPT | Performed by: FAMILY MEDICINE

## 2022-12-21 NOTE — TELEPHONE ENCOUNTER
rec'd mammogram results from [facility] stating \"a focal asymmetry in the R breast at 12 o'clock middle depth. Add'l views with possible u/s are recommended. Order for R breast diagnostic mammogram and u/s signed and faxed back to [facility] radiology.
Never smoker

## 2023-04-27 ENCOUNTER — LABORATORY ENCOUNTER (OUTPATIENT)
Dept: LAB | Age: 53
End: 2023-04-27
Attending: FAMILY MEDICINE
Payer: COMMERCIAL

## 2023-04-27 ENCOUNTER — OFFICE VISIT (OUTPATIENT)
Dept: FAMILY MEDICINE CLINIC | Facility: CLINIC | Age: 53
End: 2023-04-27
Payer: COMMERCIAL

## 2023-04-27 VITALS
TEMPERATURE: 98 F | RESPIRATION RATE: 18 BRPM | HEART RATE: 84 BPM | SYSTOLIC BLOOD PRESSURE: 114 MMHG | OXYGEN SATURATION: 100 % | BODY MASS INDEX: 32.56 KG/M2 | DIASTOLIC BLOOD PRESSURE: 80 MMHG | HEIGHT: 66.73 IN | WEIGHT: 205 LBS

## 2023-04-27 DIAGNOSIS — H18.452 SALZMANN'S NODULAR DYSTROPHY OF LEFT EYE: ICD-10-CM

## 2023-04-27 DIAGNOSIS — Z13.0 SCREENING, ANEMIA, DEFICIENCY, IRON: ICD-10-CM

## 2023-04-27 DIAGNOSIS — Z13.21 ENCOUNTER FOR VITAMIN DEFICIENCY SCREENING: ICD-10-CM

## 2023-04-27 DIAGNOSIS — H35.30 MACULAR DEGENERATION OF BOTH EYES, UNSPECIFIED TYPE: ICD-10-CM

## 2023-04-27 DIAGNOSIS — E78.00 HYPERCHOLESTEREMIA: ICD-10-CM

## 2023-04-27 DIAGNOSIS — D12.6 TUBULAR ADENOMA OF COLON: ICD-10-CM

## 2023-04-27 DIAGNOSIS — H93.13 TINNITUS OF BOTH EARS: ICD-10-CM

## 2023-04-27 DIAGNOSIS — Z80.3 FHX: BREAST CANCER: ICD-10-CM

## 2023-04-27 DIAGNOSIS — N95.1 MENOPAUSAL STATE: ICD-10-CM

## 2023-04-27 DIAGNOSIS — Z12.4 SCREENING FOR CERVICAL CANCER: ICD-10-CM

## 2023-04-27 DIAGNOSIS — Z00.00 PREVENTATIVE HEALTH CARE: Primary | ICD-10-CM

## 2023-04-27 DIAGNOSIS — Z00.00 PREVENTATIVE HEALTH CARE: ICD-10-CM

## 2023-04-27 DIAGNOSIS — Z11.51 SCREENING FOR HUMAN PAPILLOMAVIRUS (HPV): ICD-10-CM

## 2023-04-27 DIAGNOSIS — Z12.11 SCREEN FOR COLON CANCER: ICD-10-CM

## 2023-04-27 DIAGNOSIS — Z86.79 H/O: RHEUMATIC FEVER: ICD-10-CM

## 2023-04-27 LAB
ALBUMIN SERPL-MCNC: 4.1 G/DL (ref 3.4–5)
ALBUMIN/GLOB SERPL: 1.3 {RATIO} (ref 1–2)
ALP LIVER SERPL-CCNC: 53 U/L
ALT SERPL-CCNC: 25 U/L
ANION GAP SERPL CALC-SCNC: 1 MMOL/L (ref 0–18)
AST SERPL-CCNC: 15 U/L (ref 15–37)
BASOPHILS # BLD AUTO: 0.02 X10(3) UL (ref 0–0.2)
BASOPHILS NFR BLD AUTO: 0.3 %
BILIRUB SERPL-MCNC: 0.5 MG/DL (ref 0.1–2)
BUN BLD-MCNC: 11 MG/DL (ref 7–18)
CALCIUM BLD-MCNC: 9.2 MG/DL (ref 8.5–10.1)
CHLORIDE SERPL-SCNC: 105 MMOL/L (ref 98–112)
CHOLEST SERPL-MCNC: 263 MG/DL (ref ?–200)
CO2 SERPL-SCNC: 30 MMOL/L (ref 21–32)
CREAT BLD-MCNC: 1.03 MG/DL
EOSINOPHIL # BLD AUTO: 0.05 X10(3) UL (ref 0–0.7)
EOSINOPHIL NFR BLD AUTO: 0.8 %
ERYTHROCYTE [DISTWIDTH] IN BLOOD BY AUTOMATED COUNT: 12.6 %
FASTING PATIENT LIPID ANSWER: YES
FASTING STATUS PATIENT QL REPORTED: YES
GFR SERPLBLD BASED ON 1.73 SQ M-ARVRAT: 64 ML/MIN/1.73M2 (ref 60–?)
GLOBULIN PLAS-MCNC: 3.2 G/DL (ref 2.8–4.4)
GLUCOSE BLD-MCNC: 88 MG/DL (ref 70–99)
HCT VFR BLD AUTO: 39.4 %
HDLC SERPL-MCNC: 94 MG/DL (ref 40–59)
HGB BLD-MCNC: 12.7 G/DL
IMM GRANULOCYTES # BLD AUTO: 0.01 X10(3) UL (ref 0–1)
IMM GRANULOCYTES NFR BLD: 0.2 %
LDLC SERPL CALC-MCNC: 161 MG/DL (ref ?–100)
LYMPHOCYTES # BLD AUTO: 2.03 X10(3) UL (ref 1–4)
LYMPHOCYTES NFR BLD AUTO: 30.9 %
MCH RBC QN AUTO: 29.2 PG (ref 26–34)
MCHC RBC AUTO-ENTMCNC: 32.2 G/DL (ref 31–37)
MCV RBC AUTO: 90.6 FL
MONOCYTES # BLD AUTO: 0.39 X10(3) UL (ref 0.1–1)
MONOCYTES NFR BLD AUTO: 5.9 %
NEUTROPHILS # BLD AUTO: 4.06 X10 (3) UL (ref 1.5–7.7)
NEUTROPHILS # BLD AUTO: 4.06 X10(3) UL (ref 1.5–7.7)
NEUTROPHILS NFR BLD AUTO: 61.9 %
NONHDLC SERPL-MCNC: 169 MG/DL (ref ?–130)
OSMOLALITY SERPL CALC.SUM OF ELEC: 281 MOSM/KG (ref 275–295)
PLATELET # BLD AUTO: 313 10(3)UL (ref 150–450)
POTASSIUM SERPL-SCNC: 4.1 MMOL/L (ref 3.5–5.1)
PROT SERPL-MCNC: 7.3 G/DL (ref 6.4–8.2)
RBC # BLD AUTO: 4.35 X10(6)UL
SODIUM SERPL-SCNC: 136 MMOL/L (ref 136–145)
TRIGL SERPL-MCNC: 51 MG/DL (ref 30–149)
VIT D+METAB SERPL-MCNC: 77.1 NG/ML (ref 30–100)
VLDLC SERPL CALC-MCNC: 10 MG/DL (ref 0–30)
WBC # BLD AUTO: 6.6 X10(3) UL (ref 4–11)

## 2023-04-27 PROCEDURE — 3074F SYST BP LT 130 MM HG: CPT | Performed by: FAMILY MEDICINE

## 2023-04-27 PROCEDURE — 3008F BODY MASS INDEX DOCD: CPT | Performed by: FAMILY MEDICINE

## 2023-04-27 PROCEDURE — 80053 COMPREHEN METABOLIC PANEL: CPT | Performed by: FAMILY MEDICINE

## 2023-04-27 PROCEDURE — 85025 COMPLETE CBC W/AUTO DIFF WBC: CPT | Performed by: FAMILY MEDICINE

## 2023-04-27 PROCEDURE — 80061 LIPID PANEL: CPT | Performed by: FAMILY MEDICINE

## 2023-04-27 PROCEDURE — 99396 PREV VISIT EST AGE 40-64: CPT | Performed by: FAMILY MEDICINE

## 2023-04-27 PROCEDURE — 3079F DIAST BP 80-89 MM HG: CPT | Performed by: FAMILY MEDICINE

## 2023-04-27 PROCEDURE — 87624 HPV HI-RISK TYP POOLED RSLT: CPT | Performed by: FAMILY MEDICINE

## 2023-04-27 PROCEDURE — 82306 VITAMIN D 25 HYDROXY: CPT | Performed by: FAMILY MEDICINE

## 2023-04-27 NOTE — PATIENT INSTRUCTIONS
1. Preventative health care  I reviewed age-appropriate preventive health screen exams as well as immunizations  - LIPID PANEL; Future  - COMP METABOLIC PANEL (14); Future  - VITAMIN D; Future    2. Tubular adenoma of colon-9/5/18  Patient referred to Dr. Adam Re[facility] for follow-up colonoscopy this fall  - GASTRO - INTERNAL    3. Hypercholesteremia- high hdl  Reviewed goals for lipids  - LIPID PANEL; Future    4. FHx: breast cancer  Continue annual surveillance    5. H/O: rheumatic fever @10 yr   Monitor clinically    6. Macular degeneration of both eyes, unspecified type  Follow-up with ophthalmology    7. Salzmann's nodular dystrophy of left eye  Follow-up with ophthalmology, monitor clinically    8. Encounter for vitamin deficiency screening  Check vitamin D. Discussed importance and bone health and immune function  - VITAMIN D; Future    9. Menopausal state  Monitor clinically, patient may benefit from use of coconut oil as a vaginal lubricant  - VITAMIN D; Future    10. Screening, anemia, deficiency, iron  Check labs  - CBC WITH DIFFERENTIAL WITH PLATELET; Future    11. Screen for colon cancer  Continue surveillance  - GASTRO - INTERNAL    12. Screening for cervical cancer  ThinPrep Pap smear obtained  - THINPREP PAP WITH HPV REFLEX REQUEST B; Future    13. Screening for human papillomavirus (HPV)    - HPV HIGH RISK , THIN PREP COLLECTION; Future    14. Tinnitus of both ears  Discussed nerve damage as cause of tinnitus and lack of effective treatment.   Discussed importance of future hearing protection

## 2023-04-28 LAB — HPV I/H RISK 1 DNA SPEC QL NAA+PROBE: NEGATIVE

## 2023-10-25 PROBLEM — Z83.719 FAMILY HISTORY OF COLON POLYPS, UNSPECIFIED: Status: ACTIVE | Noted: 2023-10-25

## 2023-10-25 PROBLEM — Z86.0101 HISTORY OF ADENOMATOUS POLYP OF COLON: Status: ACTIVE | Noted: 2023-10-25

## 2023-10-25 PROBLEM — Z86.010 HISTORY OF ADENOMATOUS POLYP OF COLON: Status: ACTIVE | Noted: 2023-10-25

## 2023-12-04 ENCOUNTER — PATIENT MESSAGE (OUTPATIENT)
Dept: FAMILY MEDICINE CLINIC | Facility: CLINIC | Age: 53
End: 2023-12-04

## 2023-12-04 DIAGNOSIS — Z12.31 SCREENING MAMMOGRAM FOR BREAST CANCER: Primary | ICD-10-CM

## 2023-12-05 NOTE — TELEPHONE ENCOUNTER
From: April FRANCISCA Ahumada  To: Miguel Cesar  Sent: 12/4/2023 5:45 PM CST  Subject: mammogram order please    Could you please send an order for my mammogram so that I can schedule my annual . I look for it in here and did not see one but i might have missed it.  - thanks

## 2024-01-10 ENCOUNTER — HOSPITAL ENCOUNTER (OUTPATIENT)
Dept: MAMMOGRAPHY | Facility: HOSPITAL | Age: 54
Discharge: HOME | End: 2024-01-10
Attending: FAMILY MEDICINE
Payer: COMMERCIAL

## 2024-01-10 DIAGNOSIS — Z12.31 SCREENING MAMMOGRAM FOR BREAST CANCER: ICD-10-CM

## 2024-01-10 PROCEDURE — 77067 SCR MAMMO BI INCL CAD: CPT | Performed by: FAMILY MEDICINE

## 2024-01-10 PROCEDURE — 77063 BREAST TOMOSYNTHESIS BI: CPT | Performed by: FAMILY MEDICINE

## 2024-01-12 RX ORDER — SULFAMETHOXAZOLE AND TRIMETHOPRIM 800; 160 MG/1; MG/1
1 TABLET ORAL 2 TIMES DAILY
Qty: 6 TABLET | Refills: 0 | Status: SHIPPED | OUTPATIENT
Start: 2024-01-12 | End: 2024-01-15

## 2024-01-15 ENCOUNTER — HOSPITAL ENCOUNTER (OUTPATIENT)
Dept: MAMMOGRAPHY | Facility: HOSPITAL | Age: 54
Discharge: HOME | End: 2024-01-15
Attending: FAMILY MEDICINE
Payer: COMMERCIAL

## 2024-01-15 DIAGNOSIS — R92.8 ABNORMALITY OF LEFT BREAST ON SCREENING MAMMOGRAM: Primary | ICD-10-CM

## 2024-01-15 DIAGNOSIS — R92.2 INCONCLUSIVE MAMMOGRAM: ICD-10-CM

## 2024-01-15 PROCEDURE — 77065 DX MAMMO INCL CAD UNI: CPT | Performed by: FAMILY MEDICINE

## 2024-01-15 PROCEDURE — 77061 BREAST TOMOSYNTHESIS UNI: CPT | Performed by: FAMILY MEDICINE

## 2024-01-15 PROCEDURE — 76642 ULTRASOUND BREAST LIMITED: CPT | Performed by: FAMILY MEDICINE

## 2024-03-08 ENCOUNTER — OFFICE VISIT (OUTPATIENT)
Dept: FAMILY MEDICINE CLINIC | Facility: CLINIC | Age: 54
End: 2024-03-08
Payer: COMMERCIAL

## 2024-03-08 VITALS
HEIGHT: 67.2 IN | RESPIRATION RATE: 18 BRPM | BODY MASS INDEX: 30.87 KG/M2 | SYSTOLIC BLOOD PRESSURE: 118 MMHG | DIASTOLIC BLOOD PRESSURE: 78 MMHG | TEMPERATURE: 98 F | HEART RATE: 80 BPM | OXYGEN SATURATION: 98 % | WEIGHT: 199 LBS

## 2024-03-08 DIAGNOSIS — M25.562 ACUTE PAIN OF LEFT KNEE: Primary | ICD-10-CM

## 2024-03-08 DIAGNOSIS — S83.8X2A SPRAIN OF OTHER LIGAMENT OF LEFT KNEE, INITIAL ENCOUNTER: ICD-10-CM

## 2024-03-08 PROCEDURE — 3008F BODY MASS INDEX DOCD: CPT | Performed by: FAMILY MEDICINE

## 2024-03-08 PROCEDURE — 99213 OFFICE O/P EST LOW 20 MIN: CPT | Performed by: FAMILY MEDICINE

## 2024-03-08 PROCEDURE — 3074F SYST BP LT 130 MM HG: CPT | Performed by: FAMILY MEDICINE

## 2024-03-08 PROCEDURE — 3078F DIAST BP <80 MM HG: CPT | Performed by: FAMILY MEDICINE

## 2024-03-08 RX ORDER — VITS A,C,E/LUTEIN/MINERALS 300MCG-200
1 TABLET ORAL
COMMUNITY

## 2024-03-08 NOTE — PROGRESS NOTES
Clare Ahumada is a 54 year old female.  Chief Complaint   Patient presents with    Knee Pain     Left knee pain started last Tuesday        HPI:   Pt was playing pickleball 1 1/2 weeks ago,plant and turn w/ left leg, felt a \"pop\",  pain w/ bending of the knee, no locking, some initial swelling, \"clunks\"  Pt was wearing a brace, pt has continued to exercise,   Current Outpatient Medications   Medication Sig Dispense Refill    multivitamin Oral Tab Take 1 tablet by mouth daily with breakfast.      Multiple Vitamins-Minerals (OCUVITE EXTRA) Oral Tab Take by mouth.      cetirizine 10 MG Oral Tab Take 1 tablet (10 mg total) by mouth daily.      Magnesium Oxide 400 (241.3 MG) MG Oral Tab Take 800 mg by mouth daily. 1 tablet 0    Cholecalciferol (VITAMIN D3) 3000 UNITS Oral Tab Take two tablets daily 180 tablet 0      Past Medical History:   Diagnosis Date    Body piercing     Decorative tattoo 1991    FHx: breast cancer     mother    Heartburn 2003    Hemorrhoids 2003    High cholesterol     History of cardiac murmur     History of rheumatic fever     Hypercholesteremia     CHOL/ HDL 1.6, HIGH HDL    Indigestion 2003    Macular degeneration of both eyes     followed by [facility] eye [facility]    Rheumatic fever 1980    neg heart cath    Tubular adenoma of colon 09/05/2018    Wears glasses       Social History:  Social History     Socioeconomic History    Marital status:      Spouse name: Bob    Number of children: 1   Occupational History    Occupation:  (corrosion )     Comment: [employer]   Tobacco Use    Smoking status: Never    Smokeless tobacco: Never    Tobacco comments:     Non-smoker   Vaping Use    Vaping Use: Never used   Substance and Sexual Activity    Alcohol use: Yes     Alcohol/week: 4.0 standard drinks of alcohol     Types: 4 Standard drinks or equivalent per week     Comment: WEEKEND COCKTAILS    Drug use: No   Other Topics Concern    Caffeine Concern No     Comment: 2 cups  in AM    Stress Concern No    Weight Concern Yes    Special Diet No    Exercise Yes     Comment: 7 times/week walks 2-3  miles, bikes, plays softball    Seat Belt Yes        REVIEW OF SYSTEMS:   GENERAL HEALTH: feels well otherwise, denies fatigue, appetite ok  SKIN: denies any unusual skin lesions or rashes  ENT: denies nasal congestion, pnd, sore throat, ear pain or pressure, decreased hearing  RESPIRATORY: denies shortness of breath with exertion,cough, wheezing  CARDIOVASCULAR: denies chest pain on exertion, edema  MSK: see hpi    EXAM:   /78 (BP Location: Left arm, Patient Position: Sitting, Cuff Size: large)   Pulse 80   Temp 98.2 °F (36.8 °C) (Temporal)   Resp 18   Ht 5' 7.2\" (1.707 m)   Wt 199 lb (90.3 kg)   SpO2 98%   BMI 30.98 kg/m²   GENERAL: well developed, well nourished,in no apparent distress, well hydrated  SKIN: no rashes,no suspicious lesions  ENT: TMs: intact, good mobility, Nose: turbinates clear, no dc, Throat: no erythema, pnd, or lesions  NECK: supple,no adenopathy,no bruits, no thyromegaly  LUNGS: clear to auscultation, no w/r/r  CARDIO: RRR without murmur, peripheral pulses intact  Left knee: Symmetrical flexion and extension, no significant joint line tenderness, no ligamentous instability of the collateral ligaments, negative anterior and posterior drawer sign, negative pivot shift, negative Lachman maneuver, palpable click in the popliteal fossa with full flexion.  Patient able to balance on the left leg without problems, able to squat and rise without difficulty.  Toe walking causes significant dysfunction of the left leg.    ASSESSMENT AND PLAN:     Encounter Diagnoses   Name Primary?    Acute pain of left knee Yes    Sprain of other ligament of left knee, initial encounter      No orders of the defined types were placed in this encounter.    Meds & Refills for this Visit:  Requested Prescriptions      No prescriptions requested or ordered in this encounter     Imaging &  Consults:  OP REFERRAL TO [facility]  No follow-ups on file.  Patient Instructions   I reviewed the knee anatomy the patient as well as potential causes.  Suspect possible posterior horn of medial meniscal tear or perhaps a popliteal oblique ligament strain.  Would recommend several weeks of physical therapy to improve strength and mechanics of the knee.  If no significant improvement, would recommend MRI and orthopedic opinion  Discussed activity modification and avoidance strategies   The patient indicates understanding of these issues and agrees to the plan.    Rupesh Olguin DO, FAAFP

## 2024-03-08 NOTE — PATIENT INSTRUCTIONS
I reviewed the knee anatomy the patient as well as potential causes.  Suspect possible posterior horn of medial meniscal tear or perhaps a popliteal oblique ligament strain.  Would recommend several weeks of physical therapy to improve strength and mechanics of the knee.  If no significant improvement, would recommend MRI and orthopedic opinion  Discussed activity modification and avoidance strategies

## 2024-03-15 ENCOUNTER — TELEPHONE (OUTPATIENT)
Dept: PHYSICAL THERAPY | Facility: HOSPITAL | Age: 54
End: 2024-03-15

## 2024-03-19 ENCOUNTER — OFFICE VISIT (OUTPATIENT)
Dept: PHYSICAL THERAPY | Age: 54
End: 2024-03-19
Attending: FAMILY MEDICINE
Payer: COMMERCIAL

## 2024-03-19 DIAGNOSIS — M25.562 ACUTE PAIN OF LEFT KNEE: Primary | ICD-10-CM

## 2024-03-19 PROCEDURE — 97110 THERAPEUTIC EXERCISES: CPT

## 2024-03-19 PROCEDURE — 97161 PT EVAL LOW COMPLEX 20 MIN: CPT

## 2024-03-19 NOTE — PROGRESS NOTES
LOWER EXTREMITY EVALUATION:     Diagnosis:   Acute pain of left knee (M25.562)      Referring Provider: Sharif  Date of Evaluation:    3/19/2024    Precautions:  None Next MD visit:   none scheduled  Date of Surgery: n/a     PATIENT SUMMARY   Clare Ahumada is a 54 year old female who presents to therapy today with complaints of anterior and bilateral knee pain  Pt describes pain level at worst 6/10.   Current functional limitations include stepping on uneven ground, heavy housework, stepping down high curb, squatting      April describes prior level of function no issue prior to about 3 weeks when injured knee during pickle ball and about a week prior injured knee with step aerobics when step slid forward. Pt goals include no pain and back to playing ball again  Past medical history was reviewed with April. Significant findings include   Past Medical History:   Diagnosis Date    Body piercing     Decorative tattoo 1991    FHx: breast cancer     mother    Heartburn 2003    Hemorrhoids 2003    High cholesterol     History of cardiac murmur     History of rheumatic fever     Hypercholesteremia     CHOL/ HDL 1.6, HIGH HDL    Indigestion 2003    Macular degeneration of both eyes     followed by [facility]    Rheumatic fever 1980    neg heart cath    Tubular adenoma of colon 09/05/2018    Wears glasses          ASSESSMENT  April presents to physical therapy evaluation with primary c/o knee pain. The results of the objective tests and measures show pain with movement.  Functional deficits include but are not limited to stepping on uneven ground, heavy housework, stepping down high curb, squatting .  Signs and symptoms are consistent with diagnosis of knee strain. Pt and PT discussed evaluation findings, pathology, POC and HEP.  Pt voiced understanding and performs HEP correctly without reported pain. Skilled Physical Therapy is medically necessary to address the above impairments and reach functional  goals.     OBJECTIVE:   Observation: intact  Palpation: intact  Sensation: intact    AROM: (* denotes performed with pain)  Hip Knee Foot/Ankle   Flexion: R min; L min  Extension: R min; L min  Abduction: R min; L min  ER: R min; L min  IR: R min; L min Flexion: R 130; L 133  Extension: R +7 hyperextension (PROM and AROM); L +5 hyperextension (L is affected side)    DF: R min; L min  PF: R min; L min  INV: R min; L min  EV: R min; L min  Great toe ext: R min; L min     Accessory motion: intact    Patella motion: 4 way intact    Flexibility:  Hip Flexor: R 50, L 60 (L is affected side)  Hamstrings: R 182; L 182  Piriformis: R min; L min  Quads: R 69; L 60 (L is affected side)  Gastroc-soleus: L 20; R 18 (L is affected side)    Strength/MMT: (* denotes performed with pain)  Hip Knee Foot/Ankle   Flexion: R 5/5; L 5/5  Extension: R 5/5; L 5/5  Abduction: R 3-/5; L 3-/5  ER: R 5/5; L 5/5  IR: R 5/5; L 5/5 Flexion: R 5/5; L 5/5  Extension: R 5/5; L 5/5    DF: R 5/5; L 5/5  PF: R 5/5; L 5/5    Great toe ext: R 5/5; L 5/5     Special tests:   Sitting knee flexion foot on ground*  Standing marching in 15 degrees hip flexion phase of gait  Step down 9\"*    Today’s Treatment and Response:   Knee extension sitting heel on ground x10: no effect  Knee extension long arc quad x10: no effect  Knee flexion x10: no effect open chain  Knee flexion foot on step x10: no effect    Pt education was provided on exam findings, treatment diagnosis, treatment plan, expectations, and prognosis. Pt was also provided recommendations for detrimental fear avoidance behaviors and importance of remaining active.  Patient was instructed in and issued a HEP for: straight leg raise, step up    Charges: PT Eval Low Complexity, 2 there ex      Total Timed Treatment: 23 min     Total Treatment Time: 38 min       PLAN OF CARE:    Goals: (to be met in 12 visits)   Pt will improve LEFS score from 85/100 to 100/100 to display improved ability to stepping on  uneven ground, heavy housework, stepping down high curb, squatting   Pt will reduce pain at its worst from 6/10 to 3/10 to allow for improved ability to stepping on uneven ground, heavy housework, stepping down high curb, squatting   Pt will display ability to perform step down with pain to pain free allow for improved ability to stepping on uneven ground, heavy housework, stepping down high curb, squatting   Pt will be independent with home program to allow for maintenance of goals achieved in therapy.      Frequency / Duration: Patient will be seen for 2 x/week or a total of 12 visits over a 90 day period. Treatment will include: Manual Therapy, Neuromuscular Re-education, Self-Care Home Management, Therapeutic Activities, Therapeutic Exercise, and Home Exercise Program instruction    Education or treatment limitation: None  Rehab Potential:good    LEFS Score  LEFS Score: 85 % (3/18/2024  6:55 AM)      Patient/Family/Caregiver was advised of these findings, precautions, and treatment options and has agreed to actively participate in planning and for this course of care.    Thank you for your referral. Please co-sign or sign and return this letter via fax as soon as possible to [phone]. If you have any questions, please contact me at Dept: [phone]    Sincerely,  Electronically signed by therapist: Rogelio Lindo PT  Physician's certification required: Yes  I certify the need for these services furnished under this plan of treatment and while under my care.    X___________________________________________________ Date____________________    Certification From: 3/19/2024  To:6/17/2024

## 2024-03-21 ENCOUNTER — OFFICE VISIT (OUTPATIENT)
Dept: PHYSICAL THERAPY | Age: 54
End: 2024-03-21
Attending: FAMILY MEDICINE
Payer: COMMERCIAL

## 2024-03-21 PROCEDURE — 97110 THERAPEUTIC EXERCISES: CPT

## 2024-03-21 NOTE — PROGRESS NOTES
Insurance (Authorized # of Visits):  [facility]         Authorizing Physician: Dr. Olguin  Next MD visit: none scheduled    Fall Risk: standard         Precautions: n/a             Diagnosis:   Acute pain of left knee (M25.562)       Referring Provider: Sharif  Date of Evaluation:    3/19/2024    Precautions:  None Next MD visit:   none scheduled  Date of Surgery: n/a     Subjective: Did home program, no change.  Clare Ahumada is a 54 year old female who presents to therapy today with complaints of anterior and bilateral knee pain  Pt describes pain level at worst 6/10.   Current functional limitations include stepping on uneven ground, heavy housework, stepping down high curb, squatting    Pt goals include no pain and back to playing ball again    Objective:   (Pain with *)  Tested 3/21/2024:  Special tests:   Thessaly's test* (knee extension bias, not tested with knee flexion)  Narendra's test with external rotation of tibia*  Lunge*    Gait: antalgic*    Tested 3/19/2024:    AROM: (* denotes performed with pain)  Knee   Flexion: R 130; L 133  Extension: R +7 hyperextension (PROM and AROM); L +5 hyperextension (L is affected side)           Strength/MMT: (* denotes performed with pain)  Hip   Abduction: R 3-/5; L 3-/5          Special tests:   Sitting knee flexion foot on ground*  Standing marching in 15 degrees hip flexion phase of gait  Step down 9\"*       Assessment: Positive Thessaly and Narendra test, no change in session or after 2 visits of Physical Therapy. Recommend patient discuss knee with Primary Care Provider to discuss additional options for her knee and continued Physical Therapy to reach remaining goals.       Goals: (to be met in 12 visits)   Pt will improve LEFS score from 85/100 to 100/100 to display improved ability to stepping on uneven ground, heavy housework, stepping down high curb, squatting (0% PROGRESS 3/21/2024)  Pt will reduce pain at its worst from 6/10 to 3/10 to allow for improved  ability to stepping on uneven ground, heavy housework, stepping down high curb, squatting (0% PROGRESS 3/21/2024)  Pt will display ability to perform step down with pain to pain free allow for improved ability to stepping on uneven ground, heavy housework, stepping down high curb, squatting (0% PROGRESS 3/21/2024)  Pt will be independent with home program to allow for maintenance of goals achieved in therapy. (95% progress 3/21/2024)    Additional ideas:    Maybe:   Knee extension with external rotation (or with flexion, get consent), these are painful  Knee extension with internal rotation    Progress strength:    Future: flexion/rotation knees both ways (maybe)         Charges: 3 THERE Ex     Total Timed Treatment: 38 min  Total Treatment Time: 38 min  Date: 3/21/2024  Tx#: 2 Date:   Tx#: 3 Date:  Tx#: 4 Date:  Tx#: 5 Date:  Tx#: 6   Ther-Ex 38 minutes:  Knee extension in standing x15: no effect    Knee valgus therapist forces x15: no effect    Knee varus therapist forces x15: no effect    Knee internal rotation with knee flexion x20: no effect    Step up 9\" 20x3    Step up sideways 9\" 20x4    Step down 2\", 20x3    Bridge single leg bias x15x2    Side lying hip abduction 20x2    Straight leg raise, 3lbs, 20x2    Educated on home program, see below.  Verbal, visual and tactile cues for there ex.           Manual         N Re-Ed

## 2024-03-26 ENCOUNTER — OFFICE VISIT (OUTPATIENT)
Dept: PHYSICAL THERAPY | Age: 54
End: 2024-03-26
Attending: FAMILY MEDICINE
Payer: COMMERCIAL

## 2024-03-26 PROCEDURE — 97110 THERAPEUTIC EXERCISES: CPT

## 2024-03-26 NOTE — PROGRESS NOTES
Insurance (Authorized # of Visits):  [facility]         Authorizing Physician: Dr. Olguin  Next MD visit: none scheduled    Fall Risk: standard         Precautions: n/a             Diagnosis:   Acute pain of left knee (M25.562)       Referring Provider: Sharif  Date of Evaluation:    3/19/2024    Precautions:  None Next MD visit:   none scheduled  Date of Surgery: n/a     Subjective: Did home program, no change.  Clare Ahumada is a 54 year old female who presents to therapy today with complaints of anterior and bilateral knee pain  Pt describes pain level at worst 6/10.   Current functional limitations include stepping on uneven ground, heavy housework, stepping down high curb, squatting    Pt goals include no pain and back to playing ball again    Objective:   (Pain with *)  Tested 3/26/2024:  Special tests:  Standing* (credits pain due to it being afternoon)      Tested 3/21/2024:  Special tests:   Thessaly's test* (knee extension bias, not tested with knee flexion)  Narendra's test with external rotation of tibia*  Lunge*    Gait: antalgic*    Tested 3/19/2024:    AROM: (* denotes performed with pain)  Knee   Flexion: R 130; L 133  Extension: R +7 hyperextension (PROM and AROM); L +5 hyperextension (L is affected side)           Strength/MMT: (* denotes performed with pain)  Hip   Abduction: R 3-/5; L 3-/5          Special tests:   Sitting knee flexion foot on ground*  Standing marching in 15 degrees hip flexion phase of gait  Step down 9\"*       Assessment: Home program progressed for strength and stability.      Goals: (to be met in 12 visits)   Pt will improve LEFS score from 85/100 to 100/100 to display improved ability to stepping on uneven ground, heavy housework, stepping down high curb, squatting (0% PROGRESS 3/21/2024)  Pt will reduce pain at its worst from 6/10 to 3/10 to allow for improved ability to stepping on uneven ground, heavy housework, stepping down high curb, squatting (0% PROGRESS  3/21/2024)  Pt will display ability to perform step down with pain to pain free allow for improved ability to stepping on uneven ground, heavy housework, stepping down high curb, squatting (0% PROGRESS 3/21/2024)  Pt will be independent with home program to allow for maintenance of goals achieved in therapy. (95% progress 3/21/2024)    Plan:  Note: No leg press (patient reports PCP said not to do that for her knee)    Get consent:   flexion/rotation knees both ways (maybe)     Maybe:   Knee extension with external rotation (or with flexion, get consent), these are painful      Progress strength:    Can ask again Dry Needling      Charges: 3 THERE Ex     Total Timed Treatment: 38 min  Total Treatment Time: 38 min  Date: 3/21/2024  Tx#: 2 Date: 3/26/2024  Tx#: 3 Date:  Tx#: 4 Date:  Tx#: 5 Date:  Tx#: 6   Ther-Ex 38 minutes:  Knee extension in standing x15: no effect    Knee valgus therapist forces x15: no effect    Knee varus therapist forces x15: no effect    Knee internal rotation with knee flexion x20: no effect    Step up 9\" 20x3    Step up sideways 9\" 20x4    Step down 2\", 20x3    Bridge single leg bias x15x2    Side lying hip abduction 20x2    Straight leg raise, 3lbs, 20x2    Educated on home program, see below.  Verbal, visual and tactile cues for there ex.     Ther-Ex 38 minutes:  Bike 5 minutes, level 3    Single leg stance: 30' on ground and airex pad    Knee extension with internal rotation x10 therapist overpressure    Standing hip extension blue band x20x2    Standing hamstring curl blue band 20x2    Knee internal rotation with knee flexion x20: no effect    Step up 9\" 20x    Step up sideways 9\" 20x    Step down 4\", 20x2    Bridge single leg bias x20x2    Side lying hip abduction 20x    Side lying hip adduction x20    Straight leg raise, 4lbs, 20x2    Tilt board A/P and M/L 30'x1-2 (pain with M/L during, no worse after)    Educated on home program, see below.  Verbal, visual and tactile cues for there  ex.      Manual         N Re-Ed

## 2024-03-27 ENCOUNTER — PATIENT MESSAGE (OUTPATIENT)
Dept: FAMILY MEDICINE CLINIC | Facility: CLINIC | Age: 54
End: 2024-03-27

## 2024-03-27 DIAGNOSIS — M25.562 ACUTE PAIN OF LEFT KNEE: Primary | ICD-10-CM

## 2024-03-27 DIAGNOSIS — S83.8X2A SPRAIN OF OTHER LIGAMENT OF LEFT KNEE, INITIAL ENCOUNTER: ICD-10-CM

## 2024-03-28 ENCOUNTER — OFFICE VISIT (OUTPATIENT)
Dept: PHYSICAL THERAPY | Age: 54
End: 2024-03-28
Attending: FAMILY MEDICINE
Payer: COMMERCIAL

## 2024-03-28 PROCEDURE — 97110 THERAPEUTIC EXERCISES: CPT

## 2024-03-28 NOTE — TELEPHONE ENCOUNTER
From: Clare Ahumada  To: Rupesh Olguin  Sent: 3/27/2024 8:00 PM CDT  Subject: knee issue    I am going to PT and completing the instructions. There is still sharp pain in the knee during downward forward motion, jumping and sudden stops in the joint. As the day progresses i will have a burning type pain in the lower right side of the knee by the cap. I am icing and taking ibpro as needed. Will this get better eventually or is this going to long term?

## 2024-03-28 NOTE — PROGRESS NOTES
Insurance (Authorized # of Visits):  [facility]         Authorizing Physician: Dr. Olguin  Next MD visit: none scheduled    Fall Risk: standard         Precautions: n/a             Diagnosis:   Acute pain of left knee (M25.562)       Referring Provider: Sharif  Date of Evaluation:    3/19/2024    Precautions:  None Next MD visit:   none scheduled  Date of Surgery: n/a     Subjective: Did home program, no change.  Clare Ahumada is a 54 year old female who presents to therapy today with complaints of anterior and bilateral knee pain  Pt describes pain level at worst 6/10.   Current functional limitations include stepping on uneven ground, heavy housework, stepping down high curb, squatting    Pt goals include no pain and back to playing ball again    Objective:   (Pain with *)  Tested 3/28/2024:  Special tests:  Standing* (credits pain due to it being afternoon)  Thessaly's test* (knee flexion bias, intact with knee extension bias)    Gait: antalgic*    Tested 3/21/2024:  Special tests:   Narendra's test with external rotation of tibia*  Lunge*    Tested 3/19/2024:    AROM: (* denotes performed with pain)  Knee   Flexion: R 130; L 133  Extension: R +7 hyperextension (PROM and AROM); L +5 hyperextension (L is affected side)           Strength/MMT: (* denotes performed with pain)  Hip   Abduction: R 3-/5; L 3-/5          Special tests:   Sitting knee flexion foot on ground*  Standing marching in 15 degrees hip flexion phase of gait  Step down 9\"*       Assessment: Home program progressed for strength and stability. Reports no change after 4 visits. Physician notified last week.      Goals: (to be met in 12 visits)   Pt will improve LEFS score from 85/100 to 100/100 to display improved ability to stepping on uneven ground, heavy housework, stepping down high curb, squatting (0% PROGRESS 3/21/2024)  Pt will reduce pain at its worst from 6/10 to 3/10 to allow for improved ability to stepping on uneven ground, heavy  housework, stepping down high curb, squatting (0% PROGRESS 3/21/2024)  Pt will display ability to perform step down with pain to pain free allow for improved ability to stepping on uneven ground, heavy housework, stepping down high curb, squatting (0% PROGRESS 3/21/2024)  Pt will be independent with home program to allow for maintenance of goals achieved in therapy. (95% progress 3/21/2024)    Plan:  Note: No leg press (patient reports PCP said not to do that for her knee)    Can ask again Dry Needling    Maybe:   Knee extension with external rotation (or with flexion, get consent), these are painful      Progress strength:  Maybe single leg squat      Charges: 3 THERE Ex     Total Timed Treatment: 38 min  Total Treatment Time: 38 min  Date: 3/21/2024  Tx#: 2 Date: 3/26/2024  Tx#: 3 Date: 3/28/2024  Tx#: 4 Date:  Tx#: 5 Date:  Tx#: 6   Ther-Ex 38 minutes:  Knee extension in standing x15: no effect    Knee valgus therapist forces x15: no effect    Knee varus therapist forces x15: no effect    Knee internal rotation with knee flexion x20: no effect    Step up 9\" 20x3    Step up sideways 9\" 20x4    Step down 2\", 20x3    Bridge single leg bias x15x2    Side lying hip abduction 20x2    Straight leg raise, 3lbs, 20x2    Educated on home program, see below.  Verbal, visual and tactile cues for there ex.     Ther-Ex 38 minutes:  Bike 5 minutes, level 3    Single leg stance: 30' on ground and airex pad    Knee extension with internal rotation x10 therapist overpressure    Standing hip extension blue band x20x2    Standing hamstring curl blue band 20x2    Knee internal rotation with knee flexion x20: no effect    Step up 9\" 20x    Step up sideways 9\" 20x    Step down 4\", 20x2    Bridge single leg bias x20x2    Side lying hip abduction 20x    Side lying hip adduction x20    Straight leg raise, 4lbs, 20x2    Tilt board A/P and M/L 30'x1-2 (pain with M/L during, no worse after)    Educated on home program, see below.  Verbal,  visual and tactile cues for there ex. Ther-Ex 38 minutes:  Treadmill 5 minutes, incline 2.0 for a bit, which increases pain, incline lowered down, pain reduced, speed 2.0 mph    Wall slides with ball behind knees x20x2:    Supine LAQ with ball between knees x20x2:     flexion/rotation knees supine 5 minutes, therapist overpressure: no effect      Single leg stance: 60' on ground and airex padx2    Calf raises x20 one leg with one hand support    Standing hip extension blue band x20x2    Standing hamstring curl blue band 20x2    Step up 9\" 20x    Step up sideways 9\" 20x    Step down 4\", 20x    Bridge single leg bias x20x3 with 16lbs on pelvis    Side lying hip abduction 20x    Straight leg raise, 6lbs, 20x3    Tilt board A/P 30'x1     Educated on home program, see below.  Verbal, visual and tactile cues for there ex.     Manual         N Re-Ed

## 2024-03-28 NOTE — TELEPHONE ENCOUNTER
Please advise patient that I think it is time to get an opinion from an orthopedic specialist.  Please give patient contact information for Dr.Behl at [facility] and Alka Coleman and Dulce Maria Diez with [facility]

## 2024-04-02 ENCOUNTER — OFFICE VISIT (OUTPATIENT)
Dept: PHYSICAL THERAPY | Age: 54
End: 2024-04-02
Attending: FAMILY MEDICINE
Payer: COMMERCIAL

## 2024-04-02 PROCEDURE — 97110 THERAPEUTIC EXERCISES: CPT

## 2024-04-02 NOTE — PROGRESS NOTES
Insurance (Authorized # of Visits):  [facility]         Authorizing Physician: Dr. Olguin  Next MD visit: none scheduled    Fall Risk: standard         Precautions: n/a             Diagnosis:   Acute pain of left knee (M25.562)       Referring Provider: Sharif  Date of Evaluation:    3/19/2024    Precautions:  None Next MD visit:   none scheduled  Date of Surgery: n/a     Subjective: Did home program, no change.  Sees Dr. Behl on Wednesday next week (Orthopedic doctor)  Clare Ahumada is a 54 year old female who presents to therapy today with complaints of anterior and bilateral knee pain  Pt describes pain level at worst 6/10.   Current functional limitations include stepping on uneven ground, heavy housework, stepping down high curb, squatting    Pt goals include no pain and back to playing ball again    Objective:   (Pain with *)  Tested 4/2/2024:  Gait: antalgic*    Tested 3/28/2024:  Special tests:  Standing* (credits pain due to it being afternoon)  Thessaly's test* (knee flexion bias, intact with knee extension bias)    Tested 3/21/2024:  Special tests:   Narendra's test with external rotation of tibia*  Lunge*    Tested 3/19/2024:    AROM: (* denotes performed with pain)  Knee   Flexion: R 130; L 133  Extension: R +7 hyperextension (PROM and AROM); L +5 hyperextension (L is affected side)           Strength/MMT: (* denotes performed with pain)  Hip   Abduction: R 3-/5; L 3-/5          Special tests:   Sitting knee flexion foot on ground*  Standing marching in 15 degrees hip flexion phase of gait  Step down 9\"*       Assessment: Home program progressed for strength and stability. Reports no change after 5 visits. Will see Orthopedic doctor next week      Goals: (to be met in 12 visits)   Pt will improve LEFS score from 85/100 to 100/100 to display improved ability to stepping on uneven ground, heavy housework, stepping down high curb, squatting (0% PROGRESS 3/21/2024)  Pt will reduce pain at its worst from  6/10 to 3/10 to allow for improved ability to stepping on uneven ground, heavy housework, stepping down high curb, squatting (0% PROGRESS 3/21/2024)  Pt will display ability to perform step down with pain to pain free allow for improved ability to stepping on uneven ground, heavy housework, stepping down high curb, squatting (0% PROGRESS 3/21/2024)  Pt will be independent with home program to allow for maintenance of goals achieved in therapy. (95% progress 3/21/2024)    Plan:  Progress note next visit (sees Dr. Behl Wednesday)    Note: No leg press (patient reports PCP said not to do that for her knee)    Can ask again Dry Needling    Maybe:   Knee flexion with external rotation      Progress strength:  Maybe single leg squat      Charges: 3 THERE Ex     Total Timed Treatment: 38 min  Total Treatment Time: 38 min  Date: 3/21/2024  Tx#: 2 Date: 3/26/2024  Tx#: 3 Date: 3/28/2024  Tx#: 4 Date: 4/2/2024  Tx#: 5 Date:  Tx#: 6     Ther-Ex 38 minutes:  Knee extension in standing x15: no effect    Knee valgus therapist forces x15: no effect    Knee varus therapist forces x15: no effect    Knee internal rotation with knee flexion x20: no effect    Step up 9\" 20x3    Step up sideways 9\" 20x4    Step down 2\", 20x3    Bridge single leg bias x15x2    Side lying hip abduction 20x2    Straight leg raise, 3lbs, 20x2    Educated on home program, see below.  Verbal, visual and tactile cues for there ex.     Ther-Ex 38 minutes:  Bike 5 minutes, level 3    Single leg stance: 30' on ground and airex pad    Knee extension with internal rotation x10 therapist overpressure    Standing hip extension blue band x20x2    Standing hamstring curl blue band 20x2    Knee internal rotation with knee flexion x20: no effect    Step up 9\" 20x    Step up sideways 9\" 20x    Step down 4\", 20x2    Bridge single leg bias x20x2    Side lying hip abduction 20x    Side lying hip adduction x20    Straight leg raise, 4lbs, 20x2    Tilt board A/P and M/L  30'x1-2 (pain with M/L during, no worse after)    Educated on home program, see below.  Verbal, visual and tactile cues for there ex. Ther-Ex 38 minutes:  Treadmill 5 minutes, incline 2.0 for a bit, which increases pain, incline lowered down, pain reduced, speed 2.0 mph    Wall slides with ball behind knees x20x2:    Supine LAQ with ball between knees x20x2:     flexion/rotation knees supine 5 minutes, therapist overpressure: no effect      Single leg stance: 60' on ground and airex padx2    Calf raises x20 one leg with one hand support    Standing hip extension blue band x20x2    Standing hamstring curl blue band 20x2    Step up 9\" 20x    Step up sideways 9\" 20x    Step down 4\", 20x    Bridge single leg bias x20x3 with 16lbs on pelvis    Side lying hip abduction 20x    Straight leg raise, 6lbs, 20x3    Tilt board A/P 30'x1     Educated on home program, see below.  Verbal, visual and tactile cues for there ex. Ther-Ex 38 minutes:  Bike level 3-10, intervals at level 7 and then level 10, started level 3. 7:30 minutes, while discussing plan of care.    Straight leg raise and hold 60'x    Knee extension with external rotation x15: no effect (therapist overpressure)    Band walk red lateral 2 minutes    Wall slides with ball behind knees x20x    Supine LAQ with ball between knees x20x    Single leg stance: 60' on ground and airex padx2    Step up 9\" 20x2 with 20lbs in hands    Step up sideways 9\" 20x 0-20lbs in hands    Step down 4\", 20x    Bridge single leg x20x3     Straight leg raise, 6lbs, 20x3      Educated on home program, see below.  Verbal, visual and tactile cues for there ex.      Manual           N Re-Ed

## 2024-04-04 ENCOUNTER — OFFICE VISIT (OUTPATIENT)
Dept: PHYSICAL THERAPY | Age: 54
End: 2024-04-04
Attending: FAMILY MEDICINE
Payer: COMMERCIAL

## 2024-04-04 PROCEDURE — 97110 THERAPEUTIC EXERCISES: CPT

## 2024-04-04 NOTE — PROGRESS NOTES
Progress Summary  Pt has attended 6 visits in Physical Therapy.        Insurance (Authorized # of Visits):  [facility]         Authorizing Physician: Dr. Olguin  Next MD visit: none scheduled    Fall Risk: standard         Precautions: n/a             Diagnosis:   Acute pain of left knee (M25.562)       Referring Provider: Sharif  Date of Evaluation:    3/19/2024    Precautions:  None Next MD visit:   none scheduled  Date of Surgery: n/a     Subjective: Did home program, no change.  Sees Dr. Behl on Wednesday next week (Orthopedic doctor)  Clare Ahumada is a 54 year old female who presents to therapy today with complaints of anterior and bilateral knee pain  Pt describes pain level at worst 6/10.   Current functional limitations include stepping on uneven ground, heavy housework, stepping down high curb, squatting    Pt goals include no pain and back to playing ball again    Objective:   (Pain with *)  Tested 4/4/2024  Special tests:   Step down 9\"* (a little pain)    Tested 4/2/2024:  Gait: antalgic*    Tested 3/28/2024:  Special tests:  Standing* (credits pain due to it being afternoon)  Thessaly's test* (knee flexion bias, intact with knee extension bias)    Tested 3/21/2024:  Special tests:   Narendra's test with external rotation of tibia*  Lunge*    Tested 3/19/2024:    AROM: (* denotes performed with pain)  Knee   Flexion: R 130; L 133  Extension: R +7 hyperextension (PROM and AROM); L +5 hyperextension (L is affected side)           Strength/MMT: (* denotes performed with pain)  Hip   Abduction: R 3-/5; L 3-/5          Special tests:   Sitting knee flexion foot on ground*  Standing marching in 15 degrees hip flexion phase of gait         Assessment: No significant change after 6 visits of Physical Therapy. Positive Thessaly and Narendra test, suspect meniscus tear. Consults Orthopedic doctor next week.      Goals: (to be met in 12 visits)   Pt will improve LEFS score from 85/100 to 100/100 to display  improved ability to stepping on uneven ground, heavy housework, stepping down high curb, squatting (0% PROGRESS 4/4/2024)  Pt will reduce pain at its worst from 6/10 to 3/10 to allow for improved ability to stepping on uneven ground, heavy housework, stepping down high curb, squatting (0% PROGRESS 4/4/2024)  Pt will display ability to perform step down with pain to pain free allow for improved ability to stepping on uneven ground, heavy housework, stepping down high curb, squatting (50% PROGRESS 4/4/2024)  Pt will be independent with home program to allow for maintenance of goals achieved in therapy. (95% progress 4/4/2024)    Post LEFS Score  Post LEFS Score: 80 % (4/4/2024  3:17 PM)    -5 % improvement    Plan: Continue skilled Physical Therapy 2 x/week or a total of 12 visits over a 90 day period. Treatment will include: manual therapy, therapeutic exercise, therapeutic activites, neuromuscular reeducation and home program        Patient/Family/Caregiver was advised of these findings, precautions, and treatment options and has agreed to actively participate in planning and for this course of care.    Thank you for your referral. If you have any questions, please contact me at Dept: [phone].    Sincerely,  Electronically signed by therapist: Rogelio Lindo PT     Physician's certification required:  No  Please co-sign or sign and return this letter via fax as soon as possible to [phone].   I certify the need for these services furnished under this plan of treatment and while under my care.    X___________________________________________________ Date____________________    Certification From: 4/4/2024  To:7/3/2024       Note: No leg press (patient reports PCP said not to do that for her knee)        Progress strength:  6\" step down, can she do this now    Charges: 3 THERE Ex     Total Timed Treatment: 38 min  Total Treatment Time: 38 min  Date: 3/21/2024  Tx#: 2 Date: 3/26/2024  Tx#: 3 Date:  3/28/2024  Tx#: 4 Date: 4/2/2024  Tx#: 5 Date: 4/4/2024  Tx#: 6     Ther-Ex 38 minutes:  Knee extension in standing x15: no effect    Knee valgus therapist forces x15: no effect    Knee varus therapist forces x15: no effect    Knee internal rotation with knee flexion x20: no effect    Step up 9\" 20x3    Step up sideways 9\" 20x4    Step down 2\", 20x3    Bridge single leg bias x15x2    Side lying hip abduction 20x2    Straight leg raise, 3lbs, 20x2    Educated on home program, see below.  Verbal, visual and tactile cues for there ex.     Ther-Ex 38 minutes:  Bike 5 minutes, level 3    Single leg stance: 30' on ground and airex pad    Knee extension with internal rotation x10 therapist overpressure    Standing hip extension blue band x20x2    Standing hamstring curl blue band 20x2    Knee internal rotation with knee flexion x20: no effect    Step up 9\" 20x    Step up sideways 9\" 20x    Step down 4\", 20x2    Bridge single leg bias x20x2    Side lying hip abduction 20x    Side lying hip adduction x20    Straight leg raise, 4lbs, 20x2    Tilt board A/P and M/L 30'x1-2 (pain with M/L during, no worse after)    Educated on home program, see below.  Verbal, visual and tactile cues for there ex. Ther-Ex 38 minutes:  Treadmill 5 minutes, incline 2.0 for a bit, which increases pain, incline lowered down, pain reduced, speed 2.0 mph    Wall slides with ball behind knees x20x2:    Supine LAQ with ball between knees x20x2:     flexion/rotation knees supine 5 minutes, therapist overpressure: no effect      Single leg stance: 60' on ground and airex padx2    Calf raises x20 one leg with one hand support    Standing hip extension blue band x20x2    Standing hamstring curl blue band 20x2    Step up 9\" 20x    Step up sideways 9\" 20x    Step down 4\", 20x    Bridge single leg bias x20x3 with 16lbs on pelvis    Side lying hip abduction 20x    Straight leg raise, 6lbs, 20x3    Tilt board A/P 30'x1     Educated on home program, see  below.  Verbal, visual and tactile cues for there ex. Ther-Ex 38 minutes:  Bike level 3-10, intervals at level 7 and then level 10, started level 3. 7:30 minutes, while discussing plan of care.    Straight leg raise and hold 60'x    Knee extension with external rotation x15: no effect (therapist overpressure)    Band walk red lateral 2 minutes    Wall slides with ball behind knees x20x    Supine LAQ with ball between knees x20x    Single leg stance: 60' on ground and airex padx2    Step up 9\" 20x2 with 20lbs in hands    Step up sideways 9\" 20x 0-20lbs in hands    Step down 4\", 20x    Bridge single leg x20x3     Straight leg raise, 6lbs, 20x3      Educated on home program, see below.  Verbal, visual and tactile cues for there ex. Ther-Ex 38 minutes:  Nu step 5 minutes, level 10.    Single leg squat 10x     Knee flexion with external rotation therapist overpressure x10: no effect    Straight leg raise 8lbs x20x2    Band walk red and yellow lateral 2 minutes    Wall slides with ball behind knees x20x    Single leg stance: 60' on bosu ball    Step up 9\" 20x2 with 20lbs in hands    Step up sideways 9\" 20x 0 in hands x2    Step down 4\", 20x    Bridge single leg x20x2    Monster walk red and yellow band 2 minutes    Sidelying hip abduction x20    Educated on home program, see below.  Verbal, visual and tactile cues for there ex.     Manual           N Re-Ed

## 2024-04-09 ENCOUNTER — APPOINTMENT (OUTPATIENT)
Dept: PHYSICAL THERAPY | Age: 54
End: 2024-04-09
Attending: FAMILY MEDICINE
Payer: COMMERCIAL

## 2024-04-11 ENCOUNTER — APPOINTMENT (OUTPATIENT)
Dept: PHYSICAL THERAPY | Age: 54
End: 2024-04-11
Attending: FAMILY MEDICINE
Payer: COMMERCIAL

## 2024-04-11 ENCOUNTER — OFFICE VISIT (OUTPATIENT)
Dept: PHYSICAL THERAPY | Age: 54
End: 2024-04-11
Attending: FAMILY MEDICINE
Payer: COMMERCIAL

## 2024-04-11 PROCEDURE — 97110 THERAPEUTIC EXERCISES: CPT

## 2024-04-11 NOTE — PROGRESS NOTES
Discharge Summary  Pt has attended 7 visits in Physical Therapy.      Insurance (Authorized # of Visits):  [facility]         Authorizing Physician: Dr. Olguin  Next MD visit: none scheduled    Fall Risk: standard         Precautions: n/a             Diagnosis:   Acute pain of left knee (M25.562)       Referring Provider: Sharif  Date of Evaluation:    3/19/2024    Precautions:  None Next MD visit:   none scheduled  Date of Surgery: n/a     Subjective: Did home program, no change. Getting an MRI next week.   Clare Ahumada is a 54 year old female who presents to therapy today with complaints of anterior and bilateral knee pain  Pt describes pain level at worst 6/10.   Current functional limitations include stepping on uneven ground, heavy housework, stepping down high curb, squatting    Pt goals include no pain and back to playing ball again    Objective:   (Pain with *)  Tested 4/11/2024:  Special tests:   Step down 9\": intact    Tested 4/2/2024:  Gait: antalgic*    Tested 3/28/2024:  Special tests:  Standing* (credits pain due to it being afternoon)  Thessaly's test* (knee flexion bias, intact with knee extension bias)    Tested 3/21/2024:  Special tests:   Narendra's test with external rotation of tibia*  Lunge*    Tested 3/19/2024:    AROM: (* denotes performed with pain)  Knee   Flexion: R 130; L 133  Extension: R +7 hyperextension (PROM and AROM); L +5 hyperextension (L is affected side)           Strength/MMT: (* denotes performed with pain)  Hip   Abduction: R 3-/5; L 3-/5          Special tests:   Sitting knee flexion foot on ground*  Standing marching in 15 degrees hip flexion phase of gait         Assessment: Limited progress after 7 visits of Physical Therapy. Displays improved ability to descend stairs, but no other progress. Positive Thessaly and Narendra test, suspect meniscus tear. Has an MRI next week. Discharged to home program.      Goals: (to be met in 12 visits)   Pt will improve LEFS score from  85/100 to 100/100 to display improved ability to stepping on uneven ground, heavy housework, stepping down high curb, squatting (0% PROGRESS 4/4/2024)  Pt will reduce pain at its worst from 6/10 to 3/10 to allow for improved ability to stepping on uneven ground, heavy housework, stepping down high curb, squatting (0% PROGRESS 4/4/2024)  Pt will display ability to perform step down with pain to pain free allow for improved ability to stepping on uneven ground, heavy housework, stepping down high curb, squatting (50% PROGRESS 4/4/2024)  Pt will be independent with home program to allow for maintenance of goals achieved in therapy. (95% progress 4/4/2024)    Post LEFS Score  Post LEFS Score: 80 % (4/11/2024  4:27 PM)    -5 % improvement     Plan: Continue home program    Patient/Family/Caregiver was advised of these findings, precautions, and treatment options and has agreed to actively participate in planning and for this course of care.    Thank you for your referral. If you have any questions, please contact me at Dept: [phone].    Sincerely,  Electronically signed by therapist: Rogelio Lindo PT     Physician's certification required:  No  Please co-sign or sign and return this letter via fax as soon as possible to [phone].   I certify the need for these services furnished under this plan of treatment and while under my care.    X___________________________________________________ Date____________________    Certification From: 4/4/2024  To:7/3/2024       Note: No leg press (patient reports PCP said not to do that for her knee)        Progress strength:  6\" step down, can she do this now    Charges: 3 THERE Ex     Total Timed Treatment: 38 min  Total Treatment Time: 38 min  Date: 3/28/2024  Tx#: 4 Date: 4/2/2024  Tx#: 5 Date: 4/4/2024  Tx#: 6 4/11/2024  Tx#: 7    Ther-Ex 38 minutes:  Treadmill 5 minutes, incline 2.0 for a bit, which increases pain, incline lowered down, pain reduced, speed 2.0 mph    Wall  slides with ball behind knees x20x2:    Supine LAQ with ball between knees x20x2:     flexion/rotation knees supine 5 minutes, therapist overpressure: no effect      Single leg stance: 60' on ground and airex padx2    Calf raises x20 one leg with one hand support    Standing hip extension blue band x20x2    Standing hamstring curl blue band 20x2    Step up 9\" 20x    Step up sideways 9\" 20x    Step down 4\", 20x    Bridge single leg bias x20x3 with 16lbs on pelvis    Side lying hip abduction 20x    Straight leg raise, 6lbs, 20x3    Tilt board A/P 30'x1     Educated on home program, see below.  Verbal, visual and tactile cues for there ex. Ther-Ex 38 minutes:  Bike level 3-10, intervals at level 7 and then level 10, started level 3. 7:30 minutes, while discussing plan of care.    Straight leg raise and hold 60'x    Knee extension with external rotation x15: no effect (therapist overpressure)    Band walk red lateral 2 minutes    Wall slides with ball behind knees x20x    Supine LAQ with ball between knees x20x    Single leg stance: 60' on ground and airex padx2    Step up 9\" 20x2 with 20lbs in hands    Step up sideways 9\" 20x 0-20lbs in hands    Step down 4\", 20x    Bridge single leg x20x3     Straight leg raise, 6lbs, 20x3      Educated on home program, see below.  Verbal, visual and tactile cues for there ex. Ther-Ex 38 minutes:  Nu step 5 minutes, level 10.    Single leg squat 10x     Knee flexion with external rotation therapist overpressure x10: no effect    Straight leg raise 8lbs x20x2    Band walk red and yellow lateral 2 minutes    Wall slides with ball behind knees x20x    Single leg stance: 60' on bosu ball    Step up 9\" 20x2 with 20lbs in hands    Step up sideways 9\" 20x 0 in hands x2    Step down 4\", 20x    Bridge single leg x20x2    Monster walk red and yellow band 2 minutes    Sidelying hip abduction x20    Educated on home program, see below.  Verbal, visual and tactile cues for there ex. Ther-Ex 38  minutes:  Bike level 10, 5 minutes    Single leg squat 10x     Straight leg raise 10lbs x20x2    Band walk red 2 minutes    Wall slides with ball behind knees x20x one foot up on toes    Single leg stance: 60' on bosu ball x3    Step down 6\" and 9\" 20x    Bridge single leg x20x3    Monster walk red and yellow band 3 minutes    Sideways walk red band 90'    Sidelying hip abduction x20x3    Supine long arc quad red ball x20x2    Step up 9\" with 10lbs 20x2 (forward and sideways)    Educated on home program, see below.  Verbal, visual and tactile cues for there ex.

## 2024-05-22 ENCOUNTER — OFFICE VISIT (OUTPATIENT)
Dept: FAMILY MEDICINE CLINIC | Facility: CLINIC | Age: 54
End: 2024-05-22

## 2024-05-22 ENCOUNTER — MED REC SCAN ONLY (OUTPATIENT)
Dept: FAMILY MEDICINE CLINIC | Facility: CLINIC | Age: 54
End: 2024-05-22

## 2024-05-22 ENCOUNTER — LABORATORY ENCOUNTER (OUTPATIENT)
Dept: LAB | Age: 54
End: 2024-05-22
Attending: FAMILY MEDICINE

## 2024-05-22 VITALS
BODY MASS INDEX: 31.72 KG/M2 | WEIGHT: 197.38 LBS | HEART RATE: 78 BPM | RESPIRATION RATE: 12 BRPM | OXYGEN SATURATION: 97 % | DIASTOLIC BLOOD PRESSURE: 76 MMHG | HEIGHT: 66 IN | SYSTOLIC BLOOD PRESSURE: 120 MMHG | TEMPERATURE: 98 F

## 2024-05-22 DIAGNOSIS — Z86.79 H/O: RHEUMATIC FEVER: ICD-10-CM

## 2024-05-22 DIAGNOSIS — Z00.00 PREVENTATIVE HEALTH CARE: ICD-10-CM

## 2024-05-22 DIAGNOSIS — Z80.3 FHX: BREAST CANCER: ICD-10-CM

## 2024-05-22 DIAGNOSIS — Z00.00 PREVENTATIVE HEALTH CARE: Primary | ICD-10-CM

## 2024-05-22 DIAGNOSIS — H35.30 MACULAR DEGENERATION OF LEFT EYE, UNSPECIFIED TYPE: ICD-10-CM

## 2024-05-22 DIAGNOSIS — M22.42 CHONDROMALACIA PATELLAE OF LEFT KNEE: ICD-10-CM

## 2024-05-22 DIAGNOSIS — H18.452 SALZMANN'S NODULAR DYSTROPHY OF LEFT EYE: ICD-10-CM

## 2024-05-22 DIAGNOSIS — E78.00 HYPERCHOLESTEREMIA: ICD-10-CM

## 2024-05-22 LAB
CHOLEST SERPL-MCNC: 272 MG/DL (ref ?–200)
FASTING PATIENT LIPID ANSWER: YES
HDLC SERPL-MCNC: 93 MG/DL (ref 40–59)
LDLC SERPL CALC-MCNC: 171 MG/DL (ref ?–100)
NONHDLC SERPL-MCNC: 179 MG/DL (ref ?–130)
TRIGL SERPL-MCNC: 53 MG/DL (ref 30–149)
VLDLC SERPL CALC-MCNC: 10 MG/DL (ref 0–30)

## 2024-05-22 PROCEDURE — 80061 LIPID PANEL: CPT

## 2024-05-22 PROCEDURE — 3078F DIAST BP <80 MM HG: CPT | Performed by: FAMILY MEDICINE

## 2024-05-22 PROCEDURE — 3008F BODY MASS INDEX DOCD: CPT | Performed by: FAMILY MEDICINE

## 2024-05-22 PROCEDURE — 99396 PREV VISIT EST AGE 40-64: CPT | Performed by: FAMILY MEDICINE

## 2024-05-22 PROCEDURE — 3074F SYST BP LT 130 MM HG: CPT | Performed by: FAMILY MEDICINE

## 2024-05-22 RX ORDER — NABUMETONE 500 MG/1
500 TABLET, FILM COATED ORAL 2 TIMES DAILY
COMMUNITY
Start: 2024-04-10 | End: 2024-05-22 | Stop reason: ALTCHOICE

## 2024-05-22 NOTE — H&P
HPI:   Clare Ahumada is a 54 year old female who presents for a complete physical exam.  Patient concerns:   not really.     Wt Readings from Last 6 Encounters:   05/22/24 197 lb 6 oz (89.5 kg)   03/08/24 199 lb (90.3 kg)   10/09/23 188 lb (85.3 kg)   04/27/23 205 lb (93 kg)   08/30/22 205 lb (93 kg)   10/19/21 207 lb (93.9 kg)     Body mass index is 31.86 kg/m².     Cholesterol, Total (mg/dL)   Date Value   04/27/2023 263 (H)   08/25/2021 291 (H)   02/22/2019 241 (H)     CHOLESTEROL (mg/dL)   Date Value   12/06/2013 246 (H)     HDL Cholesterol (mg/dL)   Date Value   04/27/2023 94 (H)   08/25/2021 80 (H)   02/22/2019 73 (H)     HDL CHOL (mg/dL)   Date Value   12/06/2013 82     LDL Cholesterol (mg/dL)   Date Value   04/27/2023 161 (H)   08/25/2021 192 (H)   02/22/2019 152 (H)     LDL CHOLESTROL (mg/dL)   Date Value   12/06/2013 140 (H)     AST (U/L)   Date Value   04/27/2023 15   08/25/2021 17   02/22/2019 38 (H)   12/06/2013 12 (L)     ALT (U/L)   Date Value   04/27/2023 25   08/25/2021 22   02/22/2019 47   12/06/2013 21        Current Outpatient Medications   Medication Sig Dispense Refill    multivitamin Oral Tab Take 1 tablet by mouth daily with breakfast.      Multiple Vitamins-Minerals (OCUVITE EXTRA) Oral Tab Take by mouth.      cetirizine 10 MG Oral Tab Take 1 tablet (10 mg total) by mouth daily.      Magnesium Oxide 400 (241.3 MG) MG Oral Tab Take 800 mg by mouth daily. 1 tablet 0    Cholecalciferol (VITAMIN D3) 3000 UNITS Oral Tab Take two tablets daily 180 tablet 0     Allergies   Allergen Reactions    Ciprofloxacin      Hives    Gluten Flour UNKNOWN        Past Medical History:    FHx: breast cancer    mother    Hemorrhoids    History of cardiac murmur    Hypercholesteremia    CHOL/ HDL 1.6, HIGH HDL    Macular degeneration of both eyes    [facility]    Rheumatic fever    neg heart cath    Tubular adenoma of colon      Past Surgical History:   Procedure Laterality Date     Colonoscopy  09/05/2018    tubular adenoma X 2    Colonoscopy  10/25/2023    normal    Needle biopsy right Right 10/23/2020    benign    Needle biopsy right  11/2021    Fibroadenoma    Oophorectomy Right     Other surgical history      LEFT SALPINGOOPHERECTOMY DUE TO ECTOPIC PREG    Other surgical history Left     superficial keratectomy    Skin surgery      EXCISION OF HISTIOCYTOMA RIGHT BUTTOCK    Stereotactic breast biopsy  10/13/2020    right breast mass, pathology: fibrosis    Tonsillectomy  1970      Family History   Problem Relation Age of Onset    Heart Attack Father 40        MI    Hypertension Father     Cancer Father         PROSTATE CA    Heart Disorder Father     Prostate Cancer Father     Colon Polyps Father     Thyroid Disorder Mother         Hypothyroid    Breast Cancer Mother 35        Breast cancer    Diabetes Mother         DM    Heart Disorder Mother 55        CABG, AVR    Cancer Mother 35        BREAST CANCER    Obesity Mother     Other (Other) Mother     Other (CABG) Mother     Asthma Sister         Pernicious anemia    Other (Other) Sister     Other (CELIAC) Sister     Breast Cancer Cousin 45    Breast Cancer Maternal Aunt 45    Pulmonary Disease Other         Chronic anxiety-stable on meds    Colon Cancer Paternal Grandmother     Breast Cancer Maternal Aunt       Social History:   Social History     Socioeconomic History    Marital status:      Spouse name: Bob    Number of children: 1   Occupational History    Occupation:  (corrosion )     Comment: [employer]   Tobacco Use    Smoking status: Never     Passive exposure: Never    Smokeless tobacco: Never    Tobacco comments:     Non-smoker   Vaping Use    Vaping status: Never Used   Substance and Sexual Activity    Alcohol use: Yes     Alcohol/week: 4.0 standard drinks of alcohol     Types: 4 Standard drinks or equivalent per week     Comment: WEEKEND COCKTAILS    Drug use: No   Other Topics Concern    Caffeine  Concern No     Comment: 2 cups in AM    Stress Concern No    Weight Concern Yes    Special Diet No    Exercise Yes     Comment: 7 times/week walks 2-3  miles, bikes, plays softball    Seat Belt Yes     Specialists: Dr Alan- ophthamology, Dr Ferris- [place], Dr Behl- [place] [facility]  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 : Bob. Children: daughter. (Marilu)-15, owns [address] acres on [address] [place] near [place]  Exercise: 7 times per week.walks daily,wts,  Diet: low carb diet, no soda, 2 cups of coffe/ day  Gyne Hx: LMP:  many yrs,  G 1, P 1001,  Last Mammogram:  bilateral 1/10/24, repeat left breast July '24,  pap: 4/27/23     REVIEW OF SYSTEMS:   GENERAL: generally feels well, no unusual fatigue,no excessive daytime drowsiness, good appetite, wt is down 8# over past yr  SKIN: denies any unusual skin lesions or rashes  EYES:denies blurred vision or double vision, glasses/ contacts: +, last eye exam : April @ [facility], f/u in July  HEENT: denies nasal congestion, pnd, or ST, denies snoring and reported periods of apnea, denies hearing deficits  LUNGS: denies shortness of breath with exertion, no coughing or wheezing  CARDIOVASCULAR: denies chest pain on exertion, anginal equivalent symptoms, no claudication  GI: denies abdominal pain,denies constipation, diarrhea, or change in bowel habits, occ heartburn weekly  : denies dysuria, vaginal discharge or itching,no uterine bleeding x several yrs, denies incontinence  ,+vaginal dryness, mild dysparuenia  MUSCULOSKELETAL: denies back pain, + improving left knee pain after physical therapy, patient has been seeing Dr.Behl, MRI 4/16/2024 showed mild patellar tendinitis, quadriceps tendinopathy and chondromalacia patella. Still uncomfortable if any jumping, wearing a brace if uneven ground, doing HEP  NEURO: denies headaches, tremors, dizziness, numbness, tingling, muscular weakness  PSYCHE: denies depression or anxiety, minimal stress  HEMATOLOGIC: denies  unexplained bruising or bleeding  ENDOCRINE: denies heat or cold intolerance , no unexplained wt loss or gain  EXAM:   /76 (BP Location: Right arm, Patient Position: Sitting, Cuff Size: adult)   Pulse 78   Temp 98.4 °F (36.9 °C) (Temporal)   Resp 12   Ht 5' 6\" (1.676 m)   Wt 197 lb 6 oz (89.5 kg)   LMP  (LMP Unknown)   SpO2 97%   BMI 31.86 kg/m²   Body mass index is 31.86 kg/m².   GENERAL: well developed, well nourished,in no apparent distress  SKIN: no rashes,no suspicious lesions, +tattoo right scapula, upper back, 4 cm lipoma right thoracic region  HEENT: Ears:  TMs intact, canals clear, hearing normal, Nose: turbinates clear,Septum midline, Pharynx: no pnd, erythema, or airway obstruction, class 1 airway, no oral lesions  EYES:PERRLA, EOMI, Fundi: benign,conjunctiva are clear  NECK: supple,no adenopathy,no bruits, no thyromegaly or palpable nodules  BREAST: no palpable masses, nipples everted, no dc, no axillary lymph nodes palpable  LUNGS: clear to auscultation, no w/r/r  CARDIO: RRR without murmur, strong peripheral pulses, no peripheral edema  GI: good BS's,no masses, HSM or tenderness  : deferred  MUSCULOSKELETAL: back is non-tender,FROM of the back  EXTREMITIES: FROM of all joints tested,  no cyanosis, clubbing or edema, no varicosities  Left knee: Full range of motion, no joint line tenderness, mild discomfort inferior pole of patella, good quad tone, fair VMO tone, no effusion, no crepitance  NEURO: Oriented times three,CN 2-12 are intact,motor and sensory are grossly intact, Gait: normal, DTRs +2/4 bilaterally, Monofilament testing intact on plantar aspect of feet  PSYCH:  Speech clear and coherent, judgement: good, appearance: neat, Affect:bright  ASSESSMENT AND PLAN:   Clare Ahumada is a 54 year old female   Encounter Diagnoses   Name Primary?    Preventative health care Yes    Chondromalacia patellae of left knee     FHx: breast cancer     H/O: rheumatic fever @10 yr       Hypercholesteremia- high hdl     Salzmann's nodular dystrophy of left eye     Macular degeneration of left eye, unspecified type     BMI 31.0-31.9,adult      Orders Placed This Encounter   Procedures    Lipid Panel [E]    Comp Metabolic Panel (14) [E]     Meds & Refills for this Visit:  Requested Prescriptions      No prescriptions requested or ordered in this encounter     Imaging & Consults:  None  No follow-ups on file.  Patient Instructions   1. Preventative health care  I reviewed age-appropriate preventive health screening exams as well as immunizations.  - Lipid Panel [E]; Future  - Comp Metabolic Panel (14) [E]; Future    2. Chondromalacia patellae of left knee  I reviewed home exercise regimen and exercise modification    3. FHx: breast cancer  Continue annual surveillance, follow-up for left breast mammogram and ultrasound in July 4. H/O: rheumatic fever @10 yr   Monitor clinically    5. Hypercholesteremia- high hdl  Reviewed goals for lipids    6. Salzmann's nodular dystrophy of left eye  Will obtain records of recent eye exam    7. Macular degeneration of left eye, unspecified type  Will obtain records of recent eye exam    8. BMI 31.0-31.9,adult  Reviewed weight loss management strategies including lower carbohydrate food choices, avoidance of starches and eating behavior modification, aerobic activity as tolerated    Rupesh Olguin DO, FAAFP

## 2024-05-22 NOTE — PATIENT INSTRUCTIONS
1. Preventative health care  I reviewed age-appropriate preventive health screening exams as well as immunizations.  - Lipid Panel [E]; Future  - Comp Metabolic Panel (14) [E]; Future    2. Chondromalacia patellae of left knee  I reviewed home exercise regimen and exercise modification    3. FHx: breast cancer  Continue annual surveillance, follow-up for left breast mammogram and ultrasound in July 4. H/O: rheumatic fever @10 yr   Monitor clinically    5. Hypercholesteremia- high hdl  Reviewed goals for lipids    6. Salzmann's nodular dystrophy of left eye  Will obtain records of recent eye exam    7. Macular degeneration of left eye, unspecified type  Will obtain records of recent eye exam    8. BMI 31.0-31.9,adult  Reviewed weight loss management strategies including lower carbohydrate food choices, avoidance of starches and eating behavior modification, aerobic activity as tolerated

## 2024-07-15 ENCOUNTER — HOSPITAL ENCOUNTER (OUTPATIENT)
Dept: MAMMOGRAPHY | Facility: HOSPITAL | Age: 54
Discharge: HOME | End: 2024-07-15
Attending: FAMILY MEDICINE
Payer: COMMERCIAL

## 2024-07-15 DIAGNOSIS — R92.8 ABNORMALITY OF LEFT BREAST ON SCREENING MAMMOGRAM: ICD-10-CM

## 2024-07-15 DIAGNOSIS — Z80.3 FHX: BREAST CANCER: Primary | ICD-10-CM

## 2024-07-15 PROCEDURE — 77065 DX MAMMO INCL CAD UNI: CPT | Performed by: FAMILY MEDICINE

## 2024-07-15 PROCEDURE — 76642 ULTRASOUND BREAST LIMITED: CPT | Performed by: FAMILY MEDICINE

## 2024-07-15 PROCEDURE — 77061 BREAST TOMOSYNTHESIS UNI: CPT | Performed by: FAMILY MEDICINE

## 2025-01-15 ENCOUNTER — HOSPITAL ENCOUNTER (OUTPATIENT)
Dept: MAMMOGRAPHY | Facility: HOSPITAL | Age: 55
Discharge: HOME | End: 2025-01-15
Attending: FAMILY MEDICINE
Payer: COMMERCIAL

## 2025-01-15 DIAGNOSIS — Z80.3 FHX: BREAST CANCER: ICD-10-CM

## 2025-01-15 PROCEDURE — 77063 BREAST TOMOSYNTHESIS BI: CPT | Performed by: FAMILY MEDICINE

## 2025-01-15 PROCEDURE — 77067 SCR MAMMO BI INCL CAD: CPT | Performed by: FAMILY MEDICINE

## 2025-04-12 ENCOUNTER — OFFICE VISIT (OUTPATIENT)
Dept: FAMILY MEDICINE CLINIC | Facility: CLINIC | Age: 55
End: 2025-04-12
Payer: COMMERCIAL

## 2025-04-12 VITALS
BODY MASS INDEX: 31.6 KG/M2 | WEIGHT: 196.63 LBS | DIASTOLIC BLOOD PRESSURE: 75 MMHG | SYSTOLIC BLOOD PRESSURE: 115 MMHG | OXYGEN SATURATION: 97 % | RESPIRATION RATE: 19 BRPM | TEMPERATURE: 98 F | HEART RATE: 72 BPM | HEIGHT: 66 IN

## 2025-04-12 DIAGNOSIS — H10.9 BACTERIAL CONJUNCTIVITIS OF RIGHT EYE: Primary | ICD-10-CM

## 2025-04-12 PROCEDURE — 99213 OFFICE O/P EST LOW 20 MIN: CPT

## 2025-04-12 PROCEDURE — 3008F BODY MASS INDEX DOCD: CPT

## 2025-04-12 PROCEDURE — 3074F SYST BP LT 130 MM HG: CPT

## 2025-04-12 PROCEDURE — 3078F DIAST BP <80 MM HG: CPT

## 2025-04-12 RX ORDER — POLYMYXIN B SULFATE AND TRIMETHOPRIM 1; 10000 MG/ML; [USP'U]/ML
1 SOLUTION OPHTHALMIC EVERY 6 HOURS
Qty: 10 ML | Refills: 0 | Status: SHIPPED | OUTPATIENT
Start: 2025-04-12 | End: 2025-04-17

## 2025-04-12 NOTE — PROGRESS NOTES
Clare Ahumada is a 55 year old female.  HPI:     Patient in office for right eye redness and discharge that started this morning.      Current Medications[1]   Past Medical History[2]   Social History:  Short Social Hx on File[3]       REVIEW OF SYSTEMS:     Review of Systems   Constitutional: Negative.    Eyes:  Positive for discharge and redness.   Respiratory: Negative.     Cardiovascular: Negative.    Gastrointestinal: Negative.    Skin: Negative.    Neurological: Negative.        EXAM:   /75   Pulse 72   Temp 98 °F (36.7 °C) (Temporal)   Resp 19   Ht 5' 6\" (1.676 m)   Wt 196 lb 9.6 oz (89.2 kg)   LMP  (LMP Unknown)   SpO2 97%   BMI 31.73 kg/m²     Physical Exam  Constitutional:       Appearance: Normal appearance. She is normal weight.   HENT:      Head: Normocephalic and atraumatic.      Nose: Congestion present.      Mouth/Throat:      Mouth: Mucous membranes are moist.      Pharynx: Oropharynx is clear.   Eyes:      Extraocular Movements: Extraocular movements intact.      Conjunctiva/sclera:      Right eye: Right conjunctiva is injected. Chemosis and exudate present.      Pupils: Pupils are equal, round, and reactive to light.   Cardiovascular:      Rate and Rhythm: Normal rate and regular rhythm.      Pulses: Normal pulses.      Heart sounds: Normal heart sounds.   Pulmonary:      Effort: Pulmonary effort is normal.      Breath sounds: Normal breath sounds.   Musculoskeletal:         General: Normal range of motion.      Cervical back: Normal range of motion.   Skin:     General: Skin is warm and dry.      Capillary Refill: Capillary refill takes less than 2 seconds.   Neurological:      Mental Status: She is alert and oriented to person, place, and time.   Psychiatric:         Mood and Affect: Mood normal.         Behavior: Behavior normal.          ASSESSMENT AND PLAN:     1. Bacterial conjunctivitis of right eye  Polymyxin sent to pharmacy and instructions provided  - polymyxin  B-trimethoprim 35564-6.1 UNIT/ML-% Ophthalmic Solution; Place 1 drop into the right eye every 6 (six) hours for 5 days.  Dispense: 10 mL; Refill: 0    The patient indicates understanding of these issues and agrees to the plan.  The patient is asked to return in 3 to 5 days if symptoms are not improving.    TR Mendoza  4/12/2025    Note to patient: The 21st Century Cures Act makes medical notes like these available to patients in the interest of transparency. However, be advised this is a medical document. It is intended as peer to peer communication. It is written in medical language and may contain abbreviations or verbiage that are unfamiliar. It may appear blunt or direct. Medical documents are intended to carry relevant information, facts as evident, and the clinical opinion of the practitioner.            [1]   Current Outpatient Medications   Medication Sig Dispense Refill    multivitamin Oral Tab Take 1 tablet by mouth daily with breakfast.      Multiple Vitamins-Minerals (OCUVITE EXTRA) Oral Tab Take by mouth.      cetirizine 10 MG Oral Tab Take 1 tablet (10 mg total) by mouth daily.      Magnesium Oxide 400 (241.3 MG) MG Oral Tab Take 800 mg by mouth daily. 1 tablet 0    Cholecalciferol (VITAMIN D3) 3000 UNITS Oral Tab Take two tablets daily 180 tablet 0   [2]   Past Medical History:   FHx: breast cancer    mother    Hemorrhoids    History of cardiac murmur    Hypercholesteremia    CHOL/ HDL 1.6, HIGH HDL    Macular degeneration of both eyes    followed by [facility]    Rheumatic fever    neg heart cath    Tubular adenoma of colon   [3]   Social History  Socioeconomic History    Marital status:      Spouse name: Bob    Number of children: 1   Occupational History    Occupation:  (corrosion )     Comment: [employer]   Tobacco Use    Smoking status: Never     Passive exposure: Never    Smokeless tobacco: Never    Tobacco comments:     Non-smoker   Vaping Use     Vaping status: Never Used   Substance and Sexual Activity    Alcohol use: Yes     Alcohol/week: 4.0 standard drinks of alcohol     Types: 4 Standard drinks or equivalent per week     Comment: WEEKEND COCKTAILS    Drug use: No   Other Topics Concern    Caffeine Concern No     Comment: 2 cups in AM    Stress Concern No    Weight Concern Yes    Special Diet No    Exercise Yes     Comment: 7 times/week walks 2-3  miles, bikes, plays softball    Seat Belt Yes     Social Drivers of Health     Food Insecurity: No Food Insecurity (4/12/2025)    NCSS - Food Insecurity     Worried About Running Out of Food in the Last Year: No     Ran Out of Food in the Last Year: No   Transportation Needs: No Transportation Needs (4/12/2025)    NCSS - Transportation     Lack of Transportation: No   Housing Stability: Not At Risk (4/12/2025)    NCSS - Housing/Utilities     Has Housing: Yes     Worried About Losing Housing: No     Unable to Get Utilities: No

## (undated) DIAGNOSIS — R92.8 FOLLOW-UP EXAMINATION OF ABNORMAL MAMMOGRAM: Primary | ICD-10-CM

## (undated) NOTE — Clinical Note
Lala Maldonado saw your Kuldeep Juares in the office today. We lengthy discussion regarding her prior breast biopsy result. I am recommending a repeat biopsy at the 12:00 breast position. She is at high risk for breast carcinoma based on her family history.   I am recomme

## (undated) NOTE — LETTER
01/08/19 April [address]  [facility] [id number]          Our records indicate that you are due for fasting blood work (lipid panel, CMP, Magnesium).   Please call our office during normal business hours so that we may schedule this appoi

## (undated) NOTE — LETTER
11/18/21 April [address] [id number]      Dear April,    Our records indicate that you have outstanding lab work and or testing that was ordered for you and has not yet been completed:  Orders Placed This Encounter      MR